# Patient Record
Sex: MALE | Race: WHITE
[De-identification: names, ages, dates, MRNs, and addresses within clinical notes are randomized per-mention and may not be internally consistent; named-entity substitution may affect disease eponyms.]

---

## 2019-01-01 ENCOUNTER — HOSPITAL ENCOUNTER (INPATIENT)
Dept: HOSPITAL 10 - FTE | Age: 34
LOS: 3 days | Discharge: HOME | DRG: 393 | End: 2019-01-04
Attending: INTERNAL MEDICINE | Admitting: INTERNAL MEDICINE
Payer: COMMERCIAL

## 2019-01-01 VITALS — SYSTOLIC BLOOD PRESSURE: 115 MMHG | HEART RATE: 98 BPM | RESPIRATION RATE: 18 BRPM | DIASTOLIC BLOOD PRESSURE: 72 MMHG

## 2019-01-01 VITALS — SYSTOLIC BLOOD PRESSURE: 123 MMHG | HEART RATE: 100 BPM | DIASTOLIC BLOOD PRESSURE: 76 MMHG | RESPIRATION RATE: 20 BRPM

## 2019-01-01 VITALS — RESPIRATION RATE: 18 BRPM | SYSTOLIC BLOOD PRESSURE: 118 MMHG | DIASTOLIC BLOOD PRESSURE: 80 MMHG

## 2019-01-01 VITALS — SYSTOLIC BLOOD PRESSURE: 118 MMHG | HEART RATE: 98 BPM | DIASTOLIC BLOOD PRESSURE: 70 MMHG | RESPIRATION RATE: 16 BRPM

## 2019-01-01 VITALS — RESPIRATION RATE: 16 BRPM | SYSTOLIC BLOOD PRESSURE: 115 MMHG | DIASTOLIC BLOOD PRESSURE: 68 MMHG | HEART RATE: 98 BPM

## 2019-01-01 VITALS — DIASTOLIC BLOOD PRESSURE: 72 MMHG | HEART RATE: 108 BPM | RESPIRATION RATE: 16 BRPM | SYSTOLIC BLOOD PRESSURE: 120 MMHG

## 2019-01-01 VITALS — HEART RATE: 94 BPM | RESPIRATION RATE: 18 BRPM | DIASTOLIC BLOOD PRESSURE: 82 MMHG | SYSTOLIC BLOOD PRESSURE: 117 MMHG

## 2019-01-01 VITALS — SYSTOLIC BLOOD PRESSURE: 115 MMHG | DIASTOLIC BLOOD PRESSURE: 74 MMHG | RESPIRATION RATE: 15 BRPM | HEART RATE: 94 BPM

## 2019-01-01 VITALS — RESPIRATION RATE: 15 BRPM | DIASTOLIC BLOOD PRESSURE: 70 MMHG | HEART RATE: 104 BPM | SYSTOLIC BLOOD PRESSURE: 123 MMHG

## 2019-01-01 VITALS — HEART RATE: 102 BPM | RESPIRATION RATE: 17 BRPM | DIASTOLIC BLOOD PRESSURE: 74 MMHG | SYSTOLIC BLOOD PRESSURE: 121 MMHG

## 2019-01-01 VITALS — DIASTOLIC BLOOD PRESSURE: 69 MMHG | RESPIRATION RATE: 14 BRPM | SYSTOLIC BLOOD PRESSURE: 117 MMHG | HEART RATE: 94 BPM

## 2019-01-01 VITALS — RESPIRATION RATE: 18 BRPM | SYSTOLIC BLOOD PRESSURE: 122 MMHG | DIASTOLIC BLOOD PRESSURE: 72 MMHG | HEART RATE: 122 BPM

## 2019-01-01 VITALS — DIASTOLIC BLOOD PRESSURE: 73 MMHG | HEART RATE: 109 BPM | RESPIRATION RATE: 19 BRPM | SYSTOLIC BLOOD PRESSURE: 124 MMHG

## 2019-01-01 VITALS — SYSTOLIC BLOOD PRESSURE: 121 MMHG | DIASTOLIC BLOOD PRESSURE: 72 MMHG | HEART RATE: 119 BPM | RESPIRATION RATE: 16 BRPM

## 2019-01-01 VITALS — HEART RATE: 118 BPM | RESPIRATION RATE: 18 BRPM | DIASTOLIC BLOOD PRESSURE: 70 MMHG | SYSTOLIC BLOOD PRESSURE: 115 MMHG

## 2019-01-01 VITALS — RESPIRATION RATE: 17 BRPM | DIASTOLIC BLOOD PRESSURE: 77 MMHG | HEART RATE: 98 BPM | SYSTOLIC BLOOD PRESSURE: 117 MMHG

## 2019-01-01 VITALS — SYSTOLIC BLOOD PRESSURE: 118 MMHG | DIASTOLIC BLOOD PRESSURE: 78 MMHG | RESPIRATION RATE: 18 BRPM | HEART RATE: 126 BPM

## 2019-01-01 VITALS — RESPIRATION RATE: 18 BRPM | HEART RATE: 95 BPM | SYSTOLIC BLOOD PRESSURE: 116 MMHG | DIASTOLIC BLOOD PRESSURE: 76 MMHG

## 2019-01-01 VITALS — SYSTOLIC BLOOD PRESSURE: 115 MMHG | HEART RATE: 110 BPM | RESPIRATION RATE: 18 BRPM | DIASTOLIC BLOOD PRESSURE: 70 MMHG

## 2019-01-01 VITALS — RESPIRATION RATE: 16 BRPM | DIASTOLIC BLOOD PRESSURE: 77 MMHG | HEART RATE: 96 BPM | SYSTOLIC BLOOD PRESSURE: 118 MMHG

## 2019-01-01 VITALS — HEART RATE: 98 BPM

## 2019-01-01 VITALS — DIASTOLIC BLOOD PRESSURE: 75 MMHG | HEART RATE: 94 BPM | RESPIRATION RATE: 15 BRPM | SYSTOLIC BLOOD PRESSURE: 117 MMHG

## 2019-01-01 VITALS — DIASTOLIC BLOOD PRESSURE: 74 MMHG | SYSTOLIC BLOOD PRESSURE: 119 MMHG | HEART RATE: 96 BPM | RESPIRATION RATE: 17 BRPM

## 2019-01-01 VITALS — DIASTOLIC BLOOD PRESSURE: 67 MMHG | RESPIRATION RATE: 16 BRPM | SYSTOLIC BLOOD PRESSURE: 116 MMHG | HEART RATE: 108 BPM

## 2019-01-01 VITALS — DIASTOLIC BLOOD PRESSURE: 80 MMHG | RESPIRATION RATE: 16 BRPM | HEART RATE: 115 BPM | SYSTOLIC BLOOD PRESSURE: 126 MMHG

## 2019-01-01 VITALS — WEIGHT: 232.37 LBS | BODY MASS INDEX: 32.53 KG/M2 | HEIGHT: 71 IN

## 2019-01-01 VITALS — HEART RATE: 117 BPM

## 2019-01-01 VITALS — DIASTOLIC BLOOD PRESSURE: 70 MMHG | RESPIRATION RATE: 15 BRPM | SYSTOLIC BLOOD PRESSURE: 117 MMHG | HEART RATE: 94 BPM

## 2019-01-01 VITALS — HEART RATE: 96 BPM | DIASTOLIC BLOOD PRESSURE: 68 MMHG | SYSTOLIC BLOOD PRESSURE: 115 MMHG

## 2019-01-01 VITALS — HEART RATE: 108 BPM

## 2019-01-01 DIAGNOSIS — A41.9: ICD-10-CM

## 2019-01-01 DIAGNOSIS — T18.128A: Primary | ICD-10-CM

## 2019-01-01 DIAGNOSIS — K21.0: ICD-10-CM

## 2019-01-01 DIAGNOSIS — J18.9: ICD-10-CM

## 2019-01-01 DIAGNOSIS — E80.6: ICD-10-CM

## 2019-01-01 PROCEDURE — 0DC58ZZ EXTIRPATION OF MATTER FROM ESOPHAGUS, VIA NATURAL OR ARTIFICIAL OPENING ENDOSCOPIC: ICD-10-PCS | Performed by: INTERNAL MEDICINE

## 2019-01-01 PROCEDURE — 71250 CT THORAX DX C-: CPT

## 2019-01-01 PROCEDURE — 87040 BLOOD CULTURE FOR BACTERIA: CPT

## 2019-01-01 PROCEDURE — 85730 THROMBOPLASTIN TIME PARTIAL: CPT

## 2019-01-01 PROCEDURE — 71045 X-RAY EXAM CHEST 1 VIEW: CPT

## 2019-01-01 PROCEDURE — 83735 ASSAY OF MAGNESIUM: CPT

## 2019-01-01 PROCEDURE — 80048 BASIC METABOLIC PNL TOTAL CA: CPT

## 2019-01-01 PROCEDURE — 80076 HEPATIC FUNCTION PANEL: CPT

## 2019-01-01 PROCEDURE — 83690 ASSAY OF LIPASE: CPT

## 2019-01-01 PROCEDURE — 83036 HEMOGLOBIN GLYCOSYLATED A1C: CPT

## 2019-01-01 PROCEDURE — 88312 SPECIAL STAINS GROUP 1: CPT

## 2019-01-01 PROCEDURE — 96375 TX/PRO/DX INJ NEW DRUG ADDON: CPT

## 2019-01-01 PROCEDURE — C9113 INJ PANTOPRAZOLE SODIUM, VIA: HCPCS

## 2019-01-01 PROCEDURE — 96374 THER/PROPH/DIAG INJ IV PUSH: CPT

## 2019-01-01 PROCEDURE — 88313 SPECIAL STAINS GROUP 2: CPT

## 2019-01-01 PROCEDURE — 84100 ASSAY OF PHOSPHORUS: CPT

## 2019-01-01 PROCEDURE — 70490 CT SOFT TISSUE NECK W/O DYE: CPT

## 2019-01-01 PROCEDURE — 85610 PROTHROMBIN TIME: CPT

## 2019-01-01 PROCEDURE — 36415 COLL VENOUS BLD VENIPUNCTURE: CPT

## 2019-01-01 PROCEDURE — 88305 TISSUE EXAM BY PATHOLOGIST: CPT

## 2019-01-01 PROCEDURE — 99217: CPT

## 2019-01-01 PROCEDURE — 80053 COMPREHEN METABOLIC PANEL: CPT

## 2019-01-01 PROCEDURE — 83605 ASSAY OF LACTIC ACID: CPT

## 2019-01-01 PROCEDURE — 85025 COMPLETE CBC W/AUTO DIFF WBC: CPT

## 2019-01-01 PROCEDURE — 84484 ASSAY OF TROPONIN QUANT: CPT

## 2019-01-01 PROCEDURE — 93005 ELECTROCARDIOGRAM TRACING: CPT

## 2019-01-01 PROCEDURE — G0378 HOSPITAL OBSERVATION PER HR: HCPCS

## 2019-01-01 PROCEDURE — 74240 X-RAY XM UPR GI TRC 1CNTRST: CPT

## 2019-01-01 RX ADMIN — PIPERACILLIN SODIUM AND TAZOBACTAM SODIUM SCH MLS/HR: 3; .375 INJECTION, POWDER, LYOPHILIZED, FOR SOLUTION INTRAVENOUS at 23:58

## 2019-01-01 RX ADMIN — PIPERACILLIN SODIUM AND TAZOBACTAM SODIUM SCH MLS/HR: 3; .375 INJECTION, POWDER, LYOPHILIZED, FOR SOLUTION INTRAVENOUS at 18:03

## 2019-01-01 RX ADMIN — FOLIC ACID SCH MLS/HR: 5 INJECTION, SOLUTION INTRAMUSCULAR; INTRAVENOUS; SUBCUTANEOUS at 16:49

## 2019-01-01 NOTE — HP
Date/Time of Note


Date/Time of Note


DATE: 1/1/19 


TIME: 11:18





Assessment/Plan


VTE Prophylaxis


Pharmacological prophylaxis:  other





Lines/Catheters


IV Catheter Type (from Nrsg):  Peripheral IV





Assessment/Plan


Hospital Course








Objective





Physical exam





General: Patient is laying in bed and answers questions appropriately


Mentation: Patient is alert and oriented 4,


Head: Normocephalic atraumatic


Eyes: EOMI, pupils reactive to light


Neck: Supple, nontender, midline


Respiratory: Clear to auscultation bilaterally


Cardiovascular: regular rate, no obvious murmurs


Gastrointestinal: non-tender to palpation, bowel sounds heard.


Neurological: Moves all extremities spontaneously


Skin: No new skin lesions





Assessment and plan





Esophageal food impaction


-Status post EGD removal


-Doing well





GERD


-Continue PPI





Disposition


-Monitor for comp occasions overnight, DC tomorrow if stable.


Result Diagram:  


1/1/19 0708                                                                     


          1/1/19 0708





Results 24hrs





Laboratory Tests


               Test
                                1/1/19
07:08


               White Blood Count                         13.3  H


               Red Blood Count                            6.02


               Hemoglobin                                18.6  H


               Hematocrit                                52.9  H


               Mean Corpuscular Volume                    87.9


               Mean Corpuscular Hemoglobin                30.9


               Mean Corpuscular Hemoglobin
Concent       35.2  



               Red Cell Distribution Width                12.5


               Platelet Count                              369


               Mean Platelet Volume                        9.6


               Immature Granulocytes %                   0.400


               Neutrophils %                              69.1


               Lymphocytes %                              20.7


               Monocytes %                                 8.1


               Eosinophils %                               1.2


               Basophils %                                 0.5


               Nucleated Red Blood Cells %                 0.0


               Immature Granulocytes #                  0.050  H


               Neutrophils #                              9.2  H


               Lymphocytes #                               2.8


               Monocytes #                                1.1  H


               Eosinophils #                               0.2


               Basophils #                                 0.1


               Nucleated Red Blood Cells #                 0.0


               Prothrombin Time                           12.0


               Prothrombin Time Ratio                      0.9


               INR International Normalized
Ratio        0.88  



               Activated Partial
Thromboplast Time       25.7  



               Sodium Level                               145  H


               Potassium Level                             4.1


               Chloride Level                              105


               Carbon Dioxide Level                         30


               Anion Gap                                    10


               Blood Urea Nitrogen                          16


               Creatinine                                 1.14


               Est Glomerular Filtrat Rate
mL/min   > 60  



               Glucose Level                               112


               Calcium Level                               9.3


               Total Bilirubin                             1.0


               Direct Bilirubin                           0.00


               Indirect Bilirubin                          1.0


               Aspartate Amino Transf
(AST/SGOT)          50  H



               Alanine Aminotransferase
(ALT/SGPT)         52  



               Alkaline Phosphatase                         77


               Troponin I                           < 0.012


               Total Protein                              8.9  H


               Albumin                                     4.8


               Globulin                                  4.10  H


               Albumin/Globulin Ratio                     1.17


               Lipase                                       47








HPI/ROS


Admit Date/Time


Admit Date/Time


Jan 1, 2019 at 07:23





Hx of Present Illness


Patient is a  male with no significant past medical history except for 


some GERD who presents to [facility] for some beef that got 


stuck in his esophagus.  Patient states that he was eating beef and then felt to


get stuck around 9 PM yesterday.  Patient currently is status post op from EGD, 


GI was able to remove meat impaction.  Currently has no acute complaints feels 


well.  Patient denies chest pain, shortness of breath, abdominal pain, headache,


double vision, leg pain.





PMH/Family/Social


Past Medical History


Medications





Current Medications


Ondansetron HCl (Zofran Inj) 4 mg BRIDGE ORDER PRN IV NAUSEA AND/OR VOMITING;  


Start 1/1/19 at 07:30;  Stop 1/2/19 at 07:29


Acetaminophen (Tylenol Tab) 650 mg ER BRIDGE PRN PO MILD PAIN(1-3)OR ELEVATED 


TEMP;  Start 1/1/19 at 07:30;  Stop 1/2/19 at 07:29


Hydromorphone HCl (Dilaudid) 0.2 mg PACU PRN IV MILD PAIN LEVEL 1-3;  Start 


1/1/19 at 09:30;  Stop 1/1/19 at 12:00


Hydromorphone HCl (Dilaudid) 0.4 mg PACU PRN IV MODERATE PAIN LEVEL 4-6;  Start 


1/1/19 at 09:30;  Stop 1/1/19 at 12:00


Hydromorphone HCl (Dilaudid) 0.6 mg PACU PRN IV SEVERE PAIN LEVEL 7-10;  Start 


1/1/19 at 09:30;  Stop 1/1/19 at 12:00


Fentanyl (Sublimaze) 25 mcg PACU ORDER PRN IV MILD PAIN LEVEL 1-3;  Start 1/1/19


at 09:30;  Stop 1/1/19 at 12:00


Fentanyl (Sublimaze) 50 mcg PACU ORDER PRN IV MODERATE PAIN LEVEL 4-6;  Start 


1/1/19 at 09:30;  Stop 1/1/19 at 12:00


Ondansetron HCl (Zofran Inj) 4 mg PACU ORDER PRN IV NAUSEA AND/OR VOMITING;  


Start 1/1/19 at 09:30;  Stop 1/1/19 at 12:00


Metoclopramide HCl (Reglan) 10 mg PACU ORDER PRN IV NAUSEA AND/OR VOMITING;  


Start 1/1/19 at 09:30;  Stop 1/1/19 at 12:00


Albuterol (Proventil 0.083% (Neb)) 2.5 mg PACU ORDER PRN HHN WHEEZING;  Start 


1/1/19 at 09:30;  Stop 1/1/19 at 12:00


Meperidine HCl (Demerol) 25 mg PACU ORDER PRN IV POST OPERATIVE SHIVERING;  


Start 1/1/19 at 09:30;  Stop 1/1/19 at 12:00


Diphenhydramine HCl (Benadryl) 25 mg PACU ORDER PRN IV PRURITUS;  Start 1/1/19 


at 09:30;  Stop 1/1/19 at 12:00


IV Flush (NS 3 ml) 3 ml PER PROTOCOL IV ;  Start 1/1/19 at 09:30


Ondansetron HCl (Zofran Inj) 4 mg Q6H  PRN IV NAUSEA AND/OR VOMITING;  Start 


1/1/19 at 09:30


Acetaminophen (Tylenol Tab) 650 mg Q6H  PRN PO PAIN LEVEL 1-3 OR FEVER;  Start 


1/1/19 at 09:30


Acetaminophen/ Hydrocodone Bitart (Norco (5/325)) 1 tab Q6H  PRN PO PAIN LEVEL 


4-6;  Start 1/1/19 at 09:30


Pantoprazole (Protonix Iv) 40 mg DAILY@06 IV ;  Start 1/2/19 at 06:00


Coded Allergies:  


     No Known Allergy (Unverified , 1/1/19)





Social History


Smoking Status:  Never smoker





Exam/Review of Systems


Vital Signs


Vitals





Vital Signs


  Date      Temp  Pulse  Resp  B/P (MAP)   Pulse Ox  O2          O2 Flow    FiO2


Time                                                 Delivery    Rate


    1/1/19           94    18      117/82        94  Room Air


     10:56                           (94)


    1/1/19                                                             2.0


     10:26


    1/1/19  98.6


     10:21














CAROLINE SCOTT                     Jan 1, 2019 11:18

## 2019-01-01 NOTE — NUR
Report given by NANCY Villagran from [facility] to continue care for patient. Patient to go to Rm#[id number]. 
CN to pick-up patient from Rm#[id number].

## 2019-01-01 NOTE — HPN
Date/Time of Note


Date/Time of Note


DATE: 1/1/19 


TIME: 10:36





Interval H&P Admission Note


Pt. seen H&P reviewed:  No system changes











YVONNE ARENAS                      Jan 1, 2019 10:36

## 2019-01-01 NOTE — NUR
DR. WALKER SEEN THE PATIENT AND ORDERED FOR LABS , CHEST XRAY AND AND EKG. CONTINUED 
MONITORING THE PATIENT, .

## 2019-01-01 NOTE — NUR
RECEIVED ORDERS FOR INJ ZOSYN  Q6 HRS BY DR. WALKER . PATIENT RESULT INFORMED TO DR. WALKER FOR 
LACTIC ACID AS 4.1 . .MD ORDERED FOR TRANSFERING THE PATIENT TO [address] [place], [place] [address]ETRY.  
TEMPERATURE AT TIS TIME . AND . INFORMED MD REGARDING THAT. ALL SAFETY PRECAUTIONS 
CONTINUED.

## 2019-01-01 NOTE — NUR
DR. WALKER CONTACTED REGARDING THE RESULTS OF CHEST XRAY AS MILD ATELECTASIS ON LEFT LOBE 
.AND SEEN EKG , BUT NOTHING SIGNIFICANT WITH EKG AS PER DR. WALKER . AWAITING RESULTS OF BLOOD 
CULTRE. .

## 2019-01-01 NOTE — NUR
CONTACTED DR SONIA VELAZQUEZ REGARDING PATIENT TEMPERATURE ELEVATED  DEGREE AND TACHYCARDIA 
AS PULSE RATE FROM FLUCTUATING BETWEEN  110-120'S. RECEIVED ORDERS FOR IV FLUID BOLUS 1 
LITER AND IV MAINTENANCE AND MONITOR PATIENT .  PATIENT CONTINUED WITH MONITORING  THE.  
VITALS . PATIENT DENIED ANY PAIN OR OTHER  COMPLAINTS AT THIS TIME.

## 2019-01-01 NOTE — NUR
RECEIVED PATIENT REPORT FROM NANCY HODGE AT 1130. RECEIVED PATIENT  AT THIS TIME. PATIENT 
 ALERT , ORIENTED , VERBAL . VITALS STABLE UPON ADMISSION.  RIGHT  AC IV SITE INRTACT AND 
PATENT WITH IV FLUID ON FLOW. PATIENT RECEIVED  S/P EGD FOR DISIMPACTED FOOD IN THROAT. ON 
CLEAR LIQUID DIET. INTRODUCED TO THE  STAFF AND ROOM . CALL LIGHT SYSTEM INSTRUCTED . ALL 
SAFETY PRECAUTIONS STARTED  TO PREVENT FALLS AND INJURIES SUCH AS BED IN THE LOWEST 
POSITION, ALARMS ON, BRAKES ON, CALL LIGHT SYSTEM WITHIN REACH.  SCD'S ON. WILL CONTINUE TO 
MONITOR.

## 2019-01-01 NOTE — NUR
PATIENT CONTINUED WITH MONITORING , NO COMPLAINTS OF PAIN . SKIN IS INTACT. NOTED ELEVATED 
TEMPERATEURE WITH 99 DEGRTEES . TYLENOL ADMINISTERED WITH  ICE PACK APPLIED. ALL SAFETY 
PRECAUTIONS CONTINUED. PATIENT TOLERATED WITH CLEAR LIQUID DIET. VOID ONCE USING URINAL

## 2019-01-01 NOTE — NUR
PATIENT REPORT GIVEN TO NANCY BAKER AT 1840  PATIENT CONTINUED TO BE MONITORED .  IV ATB 
ADMINISTERED. PATIENT COLLECTED FROM 402 ROOM AT THIS TIME. TRANSFERRED SAFELY. FAMILY 
INFORMED REGARDING TRANSFER, AND AGREED,

## 2019-01-01 NOTE — PREAC
Date/Time of Note


Date/Time of Note


DATE: 1/1/19 


TIME: 09:09





Anesthesia Eval and Record


Evaluation


Time Pre-Procedure Interview


DATE: 1/1/19 


TIME: 09:09


Age


33


Sex


male


NPO:  8 hrs


Preoperative diagnosis


Meat Impaction


Planned procedure


EGD





Past Medical History


Past Medical History:  Includes


GI:  Morbid obesity





Surgery & Anesthesia Issues


No known issue





Meds


Anticoagulation:  No


Beta Blocker within 24 hr:  No


Reason Beta Blocker not given:  Pt. not on B-Blocker





Current Medications


Ondansetron HCl (Zofran Inj) 4 mg BRIDGE ORDER PRN IV NAUSEA AND/OR VOMITING;  


Start 1/1/19 at 07:30;  Stop 1/2/19 at 07:29


Acetaminophen (Tylenol Tab) 650 mg ER BRIDGE PRN PO MILD PAIN(1-3)OR ELEVATED 


TEMP;  Start 1/1/19 at 07:30;  Stop 1/2/19 at 07:29


Meds reviewed:  Yes





Allergies


Coded Allergies:  


     No Known Allergy (Unverified , 1/1/19)


Allergies Reviewed:  Yes





Labs/Studies


Labs Reviewed:  Reviewed by anesthesiologist


Result Diagram:  


1/1/19 0708                                                                     


          1/1/19 0708





Laboratory Tests


1/1/19 07:08








Pregnancy test:  Negative


Studies:  ECG





Pre-procedure Exam


Last vitals





Vital Signs


  Date      Temp  Pulse  Resp  B/P (MAP)   Pulse Ox  O2          O2 Flow    FiO2


Time                                                 Delivery    Rate


    1/1/19           93    20     153/101       100  Room Air


     08:28                          (118)


    1/1/19  99.0


     03:14





Airway:  Adequate mouth opening, Adequate thyromental dist


Mallampati:  Mallampati II


Teeth:  Normal


Lung:  Normal


Heart:  Normal





ASA Physical Status


ASA physical status:  2


Emergency:  E





Planned Anesthetic


General/MAC:  ETT





Pre-operative Attestations


Prior to commencing anesthesia and surgery, the patient was re-evaluated, there 


was verification of:


*The patient's identity


*The results of appropriate recent lab work and preoperative vital signs


*The above evaluation not changing prior to induction


*Anesthetic plan, risk benefits, alternative and complications discussed with 


patient/family; questions answered; patient/family understands, accepts and 


wishes to proceed.











RALEIGH WINTER                Jan 1, 2019 09:10

## 2019-01-01 NOTE — NUR
CONTACTED DR. SCOTT REGARDING THE PATIENT TEMPERATURE ELEVED  DEGREES AND TACHY CARDIA 
120'S . MD  INFORMED HE WILL BE CONTACTING TO DR. WALKER FOR SEEING THE PATIENT. AWAITING FOR 
DR. SALGUERO CONTINUED WITH IV FLUIDS AND NPO AT THIS TIME.

## 2019-01-01 NOTE — ERD
ER Documentation


Chief Complaint


Chief Complaint





FOOD STUCK IN THROAT SINCE 2100





HPI


Patient is a 33-year-old male with no medical problems who presents with a meat 


impaction.  The patient says that he ate shredded pork last night around 9 PM 


and felt it get stuck.  He has not been able to swallow anything since or even 


tolerate his own saliva.  The patient has been spitting up into a bag.  He tried


drinking soda but was unable to pass the meat impaction.  He said that he has 


felt this get stuck in the past but it has passed on its own.  He has never 


needed any EGD.





ROS


All systems reviewed and are negative except as per history of present illness.





Allergies


Allergies:  


Coded Allergies:  


     No Known Allergy (Unverified , 1/1/19)





PMhx/Soc


History of Surgery:  Yes (incision and drain)


Anesthesia Reaction:  No


Hx Neurological Disorder:  No


Hx Respiratory Disorders:  No


Hx Cardiac Disorders:  No


Hx Psychiatric Problems:  No


Hx Miscellaneous Medical Probl:  Yes (GERD)


Hx Alcohol Use:  Yes (social)


Hx Substance Use:  No


Hx Tobacco Use:  No


Smoking Status:  Never smoker





FmHx


Family History:  No diabetes





Physical Exam


Vitals





Vital Signs


  Date      Temp  Pulse  Resp  B/P (MAP)   Pulse Ox  O2          O2 Flow    FiO2


Time                                                 Delivery    Rate


    1/1/19  99.0     99    18     160/103       100


     03:14                          (122)





Physical Exam


Const:   Moderate distress


Head:   Atraumatic 


Eyes:    Normal Conjunctiva


ENT:    Normal External Ears, Nose and Mouth.


Neck:               Full range of motion. No meningismus.


Resp:   Clear to auscultation bilaterally


Cardio:   Regular rate and rhythm, no murmurs


Abd:    Soft, non tender, non distended. Normal bowel sounds


Skin:   No petechiae or rashes


Back:   No midline or flank tenderness


Ext:    No cyanosis, or edema


Neur:   Awake and alert


Psych:    Normal Mood and Affect


Results 24 hrs





Laboratory Tests


               Test
                                1/1/19
07:08


               White Blood Count                    Pending


               Red Blood Count                      Pending


               Hemoglobin                           Pending


               Hematocrit                           Pending


               Mean Corpuscular Volume              Pending


               Mean Corpuscular Hemoglobin          Pending


               Mean Corpuscular Hemoglobin
Concent  Pending 



               Red Cell Distribution Width          Pending


               Platelet Count                       Pending


               Mean Platelet Volume                 Pending





Current Medications


 Medications
   Dose
          Sig/Mart
       Start Time
   Status  Last


 (Trade)       Ordered        Route
 PRN     Stop Time              Admin
Dose


                              Reason                                Admin


 Ondansetron    4 mg           ONCE  STAT
    1/1/19        DC            1/1/19


HCl
  (Zofran                 ODT
           03:53
 1/1/19                04:03



Odt)                                         03:54


 Sodium         1,000 ml @ 
   Q1H STAT
      1/1/19        DC            1/1/19


Chloride       1,000 mls/hr   IV
            06:18
 1/1/19                07:22



                                             07:17


 Ondansetron    4 mg           ONCE  STAT
    1/1/19        DC            1/1/19


HCl
  (Zofran                 IV
            06:18
 1/1/19                07:23



Inj)                                         06:20


 Glucagon
      1 mg           ONCE  STAT
    1/1/19        DC            1/1/19


(Glucagen)                    IV
            06:28
 1/1/19                07:23



                                             06:29








Procedures/MDM


EKG read by me: 


Rate/Rhythm: Regular rate and rhythm at a rate of 91


Intervals:    Normal


Impression:     No evidence of ischemia or arrhythmia





Patient is a 33-year-old male who presents with a meat impaction.  Glucagon and 


Zofran were given without success.  Laboratory studies are being checked.  I 


spoke with Dr. Vargas from gastroenterology who is on-call for GI and will see the 


patient for endoscopy.  He says he is unsure as to what time he will be able to 


do the EGD and that admitting to the hospitalist team would be the best plan.  I


spoke with Dr. Perez for admission to a medical surgical observation bed.





Departure


Diagnosis:  


   Primary Impression:  


   Retained foreign body


Condition:  NASRIN Coates MD                 Jan 1, 2019 07:27

## 2019-01-01 NOTE — NUR
Patient transferred from [facility], pick-up by charge nurse re:Dx sepsis-Lactic acid-4, per 
report NS bolus given and started with antibiotics, [id number]. A/A/Ox4, continent of G/G. 
V/S taken. Will endorse to the night nurse to continue care, assessment for patient. Will 
continue to monitor.

## 2019-01-01 NOTE — CONS
Date/Time of Note


Date/Time of Note


DATE: 1/1/19 


TIME: 20:29





Assessment/Plan


Assessment/Plan


Hospital Course


ID NOTE => SEE DR. DREYER'S FULL NOTE DICTATION OF SAME DATE


CURRENT ABX=> Zosyn #1





24H INTERVAL SUMMARY 


* SPIKING FEVERS  post urgent EGD for removal of fluid/food bezoar impaction. 


* He is feeling better -- TNS to tele for close monitoring overnight. 


* NAD -- no significant pain - no dyspnea to suggest esophageal perf -- rather 


  feels "scratch throat".


* Family present: Mother, Aunt/Uncle -- I met with them all at bedside.      


  


EXAM 


GENERAL:A/A/O -- fevers coming down, VSS, NAD


HEENT: AT,NC, anicteric moist mucous membranes, no thrush 


NECK:  Supple, trachea midline


CHEST: Equal chest rise bilaterally, without distress = lungs clear 


HEART:  Pulse RRR 


ABDOMEN: Obese, soft, NT 


EXTREMITIES:  Warm, moves all ext, no C/C/E


Neuro: Grossly intact, no deficits


Psych: Calm, polite, pleasant, good historian, appropriate eye contact, 


mood/affect normal  





ID ASSESSMENT


32 yo obese M being seen for ABX management with: 


1.  SIRS on admission with TMax 99.+, WBC 13.3, HR 99 on admission due to acute 


esophageal food impaction 


* POD#0=> s/p urgent EGD today w/removal of fluid/food bezoar impaction in the 


  distal esophagus 


2. Sepsis w/post EGD procedure fevers > 103, , leukocytosis w/WBC rising 


to 18.2 w/lactic acid 4.1


* DDx: 


   * Esophageal perf  == Highly doubt as currently in NAD


   * Esophagitis / Esophageal ulcer / local infection 


   * Suspect transient migration of bacteria across esophageal "varices" created


     by many hours of back pressure in distal esophagus from food bolus 


     impaction.


   * Aspiration Pneumonitis event pre & post procedure -- per CT significant 


     fluid build up in esophagus prior to removal 


3. GERD--- risk for esophageal scarring, stricture, ulcers, AND risk for silent 


aspiration 


INVASIVES: PIV


ABX ALLERGY: KNDA


CURRENT ABX=> Zosyn #1





ID RECOMMENDATIONS


1. Continue Zosyn 


2. f/u on BCx sent 


3. I met with patient family at bedside and discussed my suspicion: 


* Suspect local esophageal trauma injury from the food bolus impaction -- with  


  transient migration of bacteria across esophageal "varices" created by many 


  hours of back pressure in distal esophagus from the fluid build up/bezoar -- 


  as seen in the distal esophagus on the CT prior to removal.  RATIONALE: The 


  fevers spiked so high and came on so sudden post procedure == most consistent 


  with a transient GNR bacteremia. Doubt esophageal perf -- he is simply not in 


  severe distress without much pain and no dyspnea. 


* No indication for urgent CT -- he is not in distress and do not need to expose


  him to unnecessary XRT after he has already had 2 CT today. 


* Patient family express understanding, agreement, and gratitude for my visit --


  they tell me they feel assured and I explained that patient will be monitored 


  overnight by TELE monitor which also provides assurance that he will have eyes


  watching him all night. 





.


Result Diagram:  


1/1/19 1645                                                                     


          1/1/19 0708





Results 24hrs





Laboratory Tests


        Test
                                1/1/19
07:08  1/1/19
16:45


        White Blood Count                         13.3  H      18.2  #H


        Red Blood Count                            6.02          5.27


        Hemoglobin                                18.6  H        16.3


        Hematocrit                                52.9  H        46.8


        Mean Corpuscular Volume                    87.9          88.8


        Mean Corpuscular Hemoglobin                30.9          30.9


        Mean Corpuscular Hemoglobin
Concent       35.2  
       34.8  



        Red Cell Distribution Width                12.5          12.6


        Platelet Count                              369          287  #


        Mean Platelet Volume                        9.6           9.7


        Immature Granulocytes %                   0.400         0.300


        Neutrophils %                              69.1         89.5  H


        Lymphocytes %                              20.7          4.8  L


        Monocytes %                                 8.1           5.0


        Eosinophils %                               1.2           0.1


        Basophils %                                 0.5           0.3


        Nucleated Red Blood Cells %                 0.0           0.0


        Immature Granulocytes #                  0.050  H      0.060  H


        Neutrophils #                              9.2  H       16.3  H


        Lymphocytes #                               2.8           0.9


        Monocytes #                                1.1  H         0.9


        Eosinophils #                               0.2           0.0


        Basophils #                                 0.1           0.1


        Nucleated Red Blood Cells #                 0.0           0.0


        Prothrombin Time                           12.0


        Prothrombin Time Ratio                      0.9


        INR International Normalized
Ratio        0.88  
  



        Activated Partial
Thromboplast Time       25.7  
  



        Sodium Level                               145  H


        Potassium Level                             4.1


        Chloride Level                              105


        Carbon Dioxide Level                         30


        Anion Gap                                    10


        Blood Urea Nitrogen                          16


        Creatinine                                 1.14


        Est Glomerular Filtrat Rate
mL/min   > 60  
       



        Glucose Level                               112


        Calcium Level                               9.3


        Total Bilirubin                             1.0


        Direct Bilirubin                           0.00


        Indirect Bilirubin                          1.0


        Aspartate Amino Transf
(AST/SGOT)          50  H
  



        Alanine Aminotransferase
(ALT/SGPT)         52  
  



        Alkaline Phosphatase                         77


        Troponin I                           < 0.012


        Total Protein                              8.9  H


        Albumin                                     4.8


        Globulin                                  4.10  H


        Albumin/Globulin Ratio                     1.17


        Lipase                                       47


        Lactic Acid Level                                       4.1  *H








Consultation Date/Type/Reason


Admit Date/Time


Jan 1, 2019 at 07:23


Initial Consult Date


1/1/19





Exam/Review of Systems


Vital Signs


Vitals





Vital Signs


  Date      Temp   Pulse  Resp  B/P (MAP)   Pulse Ox  O2         O2 Flow    FiO2


Time                                                  Delivery   Rate


    1/1/19  100.3


     20:01


    1/1/19           117


     19:15


    1/1/19                  19      124/73        94  Room Air


     18:53                            (90)


    1/1/19                                                             2.0


     10:26








Medications


Medications





Current Medications


IV Flush (NS 3 ml) 3 ml PER PROTOCOL IV ;  Start 1/1/19 at 09:30


Ondansetron HCl (Zofran Inj) 4 mg Q6H  PRN IV NAUSEA AND/OR VOMITING;  Start 


1/1/19 at 09:30


Acetaminophen (Tylenol Tab) 650 mg Q6H  PRN PO PAIN LEVEL 1-3 OR FEVER Last 


administered on 1/1/19at 20:01; Admin Dose 650 MG;  Start 1/1/19 at 09:30


Acetaminophen/ Hydrocodone Bitart (Norco (5/325)) 1 tab Q6H  PRN PO PAIN LEVEL 


4-6;  Start 1/1/19 at 09:30


Pantoprazole (Protonix Iv) 40 mg DAILY@06 IV ;  Start 1/2/19 at 06:00


Sodium Chloride 1,000 ml @  100 mls/hr Q10H IV  Last administered on 1/1/19at 


16:49; Admin Dose 100 MLS/HR;  Start 1/1/19 at 16:00


Piperacillin Sod/ Tazobactam Sod 100 ml @  200 mls/hr Q6 IVPB  Last administered


on 1/1/19at 18:03; Admin Dose 200 MLS/HR;  Start 1/1/19 at 18:00











FLORIDALMA STEARNS NP               Jan 1, 2019 20:39

## 2019-01-01 NOTE — CONS
Date/Time of Note


Date/Time of Note


DATE: 1/1/19 


TIME: 10:27





Assessment/Plan


Assessment/Plan


Problems:  


(1) Retained foreign body


Status:  Acute


Assessment/Plan


Assessment / Plan:





1. Esophageal Food Impaction


   - patient ingested Beef cubes around 9pm last night


   - history of GERD, patient takes PPI regularly


      -rule out stricture, eosinophilic esophagitis


   - arrived at ED ~ 3am


      -they attempted to use reglan, glucagon - unable to relieve patient's 


chest pressure and mucous


   -called for EGD and food retrieval


      -urgent EGD booked. Anesthesia and GI team activated


Result Diagram:  


1/1/19 0708                                                                     


          1/1/19 0708





Results 24hrs





Laboratory Tests


               Test
                                1/1/19
07:08


               White Blood Count                         13.3  H


               Red Blood Count                            6.02


               Hemoglobin                                18.6  H


               Hematocrit                                52.9  H


               Mean Corpuscular Volume                    87.9


               Mean Corpuscular Hemoglobin                30.9


               Mean Corpuscular Hemoglobin
Concent       35.2  



               Red Cell Distribution Width                12.5


               Platelet Count                              369


               Mean Platelet Volume                        9.6


               Immature Granulocytes %                   0.400


               Neutrophils %                              69.1


               Lymphocytes %                              20.7


               Monocytes %                                 8.1


               Eosinophils %                               1.2


               Basophils %                                 0.5


               Nucleated Red Blood Cells %                 0.0


               Immature Granulocytes #                  0.050  H


               Neutrophils #                              9.2  H


               Lymphocytes #                               2.8


               Monocytes #                                1.1  H


               Eosinophils #                               0.2


               Basophils #                                 0.1


               Nucleated Red Blood Cells #                 0.0


               Prothrombin Time                           12.0


               Prothrombin Time Ratio                      0.9


               INR International Normalized
Ratio        0.88  



               Activated Partial
Thromboplast Time       25.7  



               Sodium Level                               145  H


               Potassium Level                             4.1


               Chloride Level                              105


               Carbon Dioxide Level                         30


               Anion Gap                                    10


               Blood Urea Nitrogen                          16


               Creatinine                                 1.14


               Est Glomerular Filtrat Rate
mL/min   > 60  



               Glucose Level                               112


               Calcium Level                               9.3


               Total Bilirubin                             1.0


               Direct Bilirubin                           0.00


               Indirect Bilirubin                          1.0


               Aspartate Amino Transf
(AST/SGOT)          50  H



               Alanine Aminotransferase
(ALT/SGPT)         52  



               Alkaline Phosphatase                         77


               Troponin I                           < 0.012


               Total Protein                              8.9  H


               Albumin                                     4.8


               Globulin                                  4.10  H


               Albumin/Globulin Ratio                     1.17


               Lipase                                       47








Consultation Date/Type/Reason


Admit Date/Time


Jan 1, 2019 at 07:23


Date of Consultation:  Jan 1, 2019


Type of Consult


GI


Reason for Consultation


Food impaction





Hx of Present Illness


Patient has chronic GERD.


Takes PPI OTC from [facility].


Was in usual state of health. 


Ate beef around 9pm last night. Then felt meat get stuck in his throat.


Discomfort severe, went to ED.





No prior EGDs.


Has a history of the same (not as bad episodes per patient). 


Never had food persist. 


Unable to swallow saliva.


Gastrointestinal:  vomiting, other (unable to keep secretions down.)





Past Medical History


Medications





Current Medications


Ondansetron HCl (Zofran Inj) 4 mg BRIDGE ORDER PRN IV NAUSEA AND/OR VOMITING;  


Start 1/1/19 at 07:30;  Stop 1/2/19 at 07:29


Acetaminophen (Tylenol Tab) 650 mg ER BRIDGE PRN PO MILD PAIN(1-3)OR ELEVATED 


TEMP;  Start 1/1/19 at 07:30;  Stop 1/2/19 at 07:29


Hydromorphone HCl (Dilaudid) 0.2 mg PACU PRN IV MILD PAIN LEVEL 1-3;  Start 


1/1/19 at 09:30;  Stop 1/1/19 at 12:00


Hydromorphone HCl (Dilaudid) 0.4 mg PACU PRN IV MODERATE PAIN LEVEL 4-6;  Start 


1/1/19 at 09:30;  Stop 1/1/19 at 12:00


Hydromorphone HCl (Dilaudid) 0.6 mg PACU PRN IV SEVERE PAIN LEVEL 7-10;  Start 


1/1/19 at 09:30;  Stop 1/1/19 at 12:00


Fentanyl (Sublimaze) 25 mcg PACU ORDER PRN IV MILD PAIN LEVEL 1-3;  Start 1/1/19


at 09:30;  Stop 1/1/19 at 12:00


Fentanyl (Sublimaze) 50 mcg PACU ORDER PRN IV MODERATE PAIN LEVEL 4-6;  Start 


1/1/19 at 09:30;  Stop 1/1/19 at 12:00


Ondansetron HCl (Zofran Inj) 4 mg PACU ORDER PRN IV NAUSEA AND/OR VOMITING;  


Start 1/1/19 at 09:30;  Stop 1/1/19 at 12:00


Metoclopramide HCl (Reglan) 10 mg PACU ORDER PRN IV NAUSEA AND/OR VOMITING;  


Start 1/1/19 at 09:30;  Stop 1/1/19 at 12:00


Albuterol (Proventil 0.083% (Neb)) 2.5 mg PACU ORDER PRN HHN WHEEZING;  Start 


1/1/19 at 09:30;  Stop 1/1/19 at 12:00


Meperidine HCl (Demerol) 25 mg PACU ORDER PRN IV POST OPERATIVE SHIVERING;  Sta


rt 1/1/19 at 09:30;  Stop 1/1/19 at 12:00


Diphenhydramine HCl (Benadryl) 25 mg PACU ORDER PRN IV PRURITUS;  Start 1/1/19 


at 09:30;  Stop 1/1/19 at 12:00


Sodium Chloride 1,000 ml @  50 mls/hr Q20H IV ;  Start 1/1/19 at 09:10


IV Flush (NS 3 ml) 3 ml PER PROTOCOL IV ;  Start 1/1/19 at 09:30


Ondansetron HCl (Zofran Inj) 4 mg Q6H  PRN IV NAUSEA AND/OR VOMITING;  Start 


1/1/19 at 09:30


Acetaminophen (Tylenol Tab) 650 mg Q6H  PRN PO PAIN LEVEL 1-3 OR FEVER;  Start 


1/1/19 at 09:30


Acetaminophen/ Hydrocodone Bitart (Norco (5/325)) 1 tab Q6H  PRN PO PAIN LEVEL 


4-6;  Start 1/1/19 at 09:30


Pantoprazole (Protonix Iv) 40 mg DAILY@06 IV ;  Start 1/2/19 at 06:00


Allergies:  


Coded Allergies:  


     No Known Allergy (Unverified , 1/1/19)





Social History


Smoking Status:  Never smoker





Exam/Review of Systems


Vital Signs


Vitals





Vital Signs


  Date      Temp  Pulse  Resp  B/P (MAP)   Pulse Ox  O2          O2 Flow    FiO2


Time                                                 Delivery    Rate


    1/1/19           93    20     153/101       100  Room Air


     08:28                          (118)


    1/1/19  99.0


     03:14








Exam


Eyes:  nl conjunctiva, EOMI, nl lids, nl sclera, PERRL


Respiratory:  clear to auscultation, normal air movement


Cardiovascular:  regular rate and rhythm, nl pulses


Gastrointestinal:  soft, nl liver, spleen, non-tender


Extremities:  normal pulses


Skin:  nl turgor; 


   No rash or lesions





Medications


Medications





Current Medications


Ondansetron HCl (Zofran Inj) 4 mg BRIDGE ORDER PRN IV NAUSEA AND/OR VOMITING;  


Start 1/1/19 at 07:30;  Stop 1/2/19 at 07:29


Acetaminophen (Tylenol Tab) 650 mg ER BRIDGE PRN PO MILD PAIN(1-3)OR ELEVATED 


TEMP;  Start 1/1/19 at 07:30;  Stop 1/2/19 at 07:29


Hydromorphone HCl (Dilaudid) 0.2 mg PACU PRN IV MILD PAIN LEVEL 1-3;  Start 


1/1/19 at 09:30;  Stop 1/1/19 at 12:00


Hydromorphone HCl (Dilaudid) 0.4 mg PACU PRN IV MODERATE PAIN LEVEL 4-6;  Start 


1/1/19 at 09:30;  Stop 1/1/19 at 12:00


Hydromorphone HCl (Dilaudid) 0.6 mg PACU PRN IV SEVERE PAIN LEVEL 7-10;  Start 


1/1/19 at 09:30;  Stop 1/1/19 at 12:00


Fentanyl (Sublimaze) 25 mcg PACU ORDER PRN IV MILD PAIN LEVEL 1-3;  Start 1/1/19


at 09:30;  Stop 1/1/19 at 12:00


Fentanyl (Sublimaze) 50 mcg PACU ORDER PRN IV MODERATE PAIN LEVEL 4-6;  Start 


1/1/19 at 09:30;  Stop 1/1/19 at 12:00


Ondansetron HCl (Zofran Inj) 4 mg PACU ORDER PRN IV NAUSEA AND/OR VOMITING;  


Start 1/1/19 at 09:30;  Stop 1/1/19 at 12:00


Metoclopramide HCl (Reglan) 10 mg PACU ORDER PRN IV NAUSEA AND/OR VOMITING;  


Start 1/1/19 at 09:30;  Stop 1/1/19 at 12:00


Albuterol (Proventil 0.083% (Neb)) 2.5 mg PACU ORDER PRN HHN WHEEZING;  Start 


1/1/19 at 09:30;  Stop 1/1/19 at 12:00


Meperidine HCl (Demerol) 25 mg PACU ORDER PRN IV POST OPERATIVE SHIVERING;  


Start 1/1/19 at 09:30;  Stop 1/1/19 at 12:00


Diphenhydramine HCl (Benadryl) 25 mg PACU ORDER PRN IV PRURITUS;  Start 1/1/19 


at 09:30;  Stop 1/1/19 at 12:00


Sodium Chloride 1,000 ml @  50 mls/hr Q20H IV ;  Start 1/1/19 at 09:10


IV Flush (NS 3 ml) 3 ml PER PROTOCOL IV ;  Start 1/1/19 at 09:30


Ondansetron HCl (Zofran Inj) 4 mg Q6H  PRN IV NAUSEA AND/OR VOMITING;  Start 


1/1/19 at 09:30


Acetaminophen (Tylenol Tab) 650 mg Q6H  PRN PO PAIN LEVEL 1-3 OR FEVER;  Start 


1/1/19 at 09:30


Acetaminophen/ Hydrocodone Bitart (Norco (5/325)) 1 tab Q6H  PRN PO PAIN LEVEL 


4-6;  Start 1/1/19 at 09:30


Pantoprazole (Protonix Iv) 40 mg DAILY@06 IV ;  Start 1/2/19 at 06:00











YVONNE ARENAS                      Jan 1, 2019 10:35

## 2019-01-02 VITALS — HEART RATE: 90 BPM | RESPIRATION RATE: 19 BRPM | DIASTOLIC BLOOD PRESSURE: 79 MMHG | SYSTOLIC BLOOD PRESSURE: 140 MMHG

## 2019-01-02 VITALS — RESPIRATION RATE: 18 BRPM | HEART RATE: 72 BPM | SYSTOLIC BLOOD PRESSURE: 127 MMHG | DIASTOLIC BLOOD PRESSURE: 75 MMHG

## 2019-01-02 VITALS — HEART RATE: 87 BPM

## 2019-01-02 VITALS — HEART RATE: 99 BPM

## 2019-01-02 VITALS — RESPIRATION RATE: 20 BRPM | HEART RATE: 94 BPM | DIASTOLIC BLOOD PRESSURE: 69 MMHG | SYSTOLIC BLOOD PRESSURE: 126 MMHG

## 2019-01-02 VITALS — RESPIRATION RATE: 19 BRPM | SYSTOLIC BLOOD PRESSURE: 133 MMHG | HEART RATE: 99 BPM | DIASTOLIC BLOOD PRESSURE: 72 MMHG

## 2019-01-02 VITALS — HEART RATE: 98 BPM

## 2019-01-02 VITALS — HEART RATE: 83 BPM

## 2019-01-02 VITALS — DIASTOLIC BLOOD PRESSURE: 79 MMHG | HEART RATE: 92 BPM | RESPIRATION RATE: 20 BRPM | SYSTOLIC BLOOD PRESSURE: 128 MMHG

## 2019-01-02 VITALS — RESPIRATION RATE: 18 BRPM | SYSTOLIC BLOOD PRESSURE: 125 MMHG | DIASTOLIC BLOOD PRESSURE: 78 MMHG | HEART RATE: 82 BPM

## 2019-01-02 VITALS — HEART RATE: 93 BPM

## 2019-01-02 VITALS — HEART RATE: 88 BPM | RESPIRATION RATE: 18 BRPM | SYSTOLIC BLOOD PRESSURE: 130 MMHG | DIASTOLIC BLOOD PRESSURE: 73 MMHG

## 2019-01-02 VITALS — HEART RATE: 90 BPM

## 2019-01-02 RX ADMIN — FOLIC ACID SCH MLS/HR: 5 INJECTION, SOLUTION INTRAMUSCULAR; INTRAVENOUS; SUBCUTANEOUS at 11:23

## 2019-01-02 RX ADMIN — PIPERACILLIN SODIUM AND TAZOBACTAM SODIUM SCH MLS/HR: 3; .375 INJECTION, POWDER, LYOPHILIZED, FOR SOLUTION INTRAVENOUS at 11:23

## 2019-01-02 RX ADMIN — FOLIC ACID SCH MLS/HR: 5 INJECTION, SOLUTION INTRAMUSCULAR; INTRAVENOUS; SUBCUTANEOUS at 22:51

## 2019-01-02 RX ADMIN — FOLIC ACID SCH MLS/HR: 5 INJECTION, SOLUTION INTRAMUSCULAR; INTRAVENOUS; SUBCUTANEOUS at 02:00

## 2019-01-02 RX ADMIN — PIPERACILLIN SODIUM AND TAZOBACTAM SODIUM SCH MLS/HR: 3; .375 INJECTION, POWDER, LYOPHILIZED, FOR SOLUTION INTRAVENOUS at 05:57

## 2019-01-02 RX ADMIN — PANTOPRAZOLE SODIUM SCH MG: 40 INJECTION, POWDER, FOR SOLUTION INTRAVENOUS at 05:57

## 2019-01-02 RX ADMIN — PIPERACILLIN SODIUM AND TAZOBACTAM SODIUM SCH MLS/HR: 3; .375 INJECTION, POWDER, LYOPHILIZED, FOR SOLUTION INTRAVENOUS at 18:27

## 2019-01-02 RX ADMIN — FOLIC ACID SCH MLS/HR: 5 INJECTION, SOLUTION INTRAMUSCULAR; INTRAVENOUS; SUBCUTANEOUS at 04:59

## 2019-01-02 NOTE — NUR
Pt arrived on unit at around 2340. Pt was transported by wheelchair. Pt in no signs of acute 
distress. Vitals signs taken and were WNL. Pt is afebrile and denies pain at this time. Pt 
inventory taken and pt states to have all his belongings with him. Pictures of heels and 
sacrum taken per protocol. Fall precautions implemented. Pt explained to call for help when 
getting out of bed. Will continue to monitor.

## 2019-01-02 NOTE — PN
Date/Time of Note


Date/Time of Note


DATE: 1/2/19 


TIME: 13:20





Objective


Vitals





Vital Signs


  Date      Temp  Pulse  Resp  B/P (MAP)   Pulse Ox  O2          O2 Flow    FiO2


Time                                                 Delivery    Rate


    1/2/19           93


     12:31


    1/2/19  98.3           20      128/79        16  Room Air


     11:14                           (95)


    1/1/19                                                             2.0


     10:26








Intake and Output





1/1/19 1/1/19 1/2/19





1515:00


23:00


07:00





IntakeIntake Total


500 ml


400 ml


900 ml





OutputOutput Total


200 ml





BalanceBalance


300 ml


400 ml


900 ml














Results


Result Diagram:  


1/2/19 0527                                                                     


          1/2/19 0527








Medications


Medications





Current Medications


IV Flush (NS 3 ml) 3 ml PER PROTOCOL IV ;  Start 1/1/19 at 09:30


Ondansetron HCl (Zofran Inj) 4 mg Q6H  PRN IV NAUSEA AND/OR VOMITING;  Start 


1/1/19 at 09:30


Acetaminophen (Tylenol Tab) 650 mg Q6H  PRN PO PAIN LEVEL 1-3 OR FEVER Last 


administered on 1/1/19at 23:58; Admin Dose 650 MG;  Start 1/1/19 at 09:30


Acetaminophen/ Hydrocodone Bitart (Norco (5/325)) 1 tab Q6H  PRN PO PAIN LEVEL 


4-6 Last administered on 1/2/19at 11:22; Admin Dose 1 TAB;  Start 1/1/19 at 


09:30


Pantoprazole (Protonix Iv) 40 mg DAILY@06 IV  Last administered on 1/2/19at 


05:57; Admin Dose 40 MG;  Start 1/2/19 at 06:00


Sodium Chloride 1,000 ml @  100 mls/hr Q10H IV  Last administered on 1/2/19at 


04:59; Admin Dose 100 MLS/HR;  Start 1/1/19 at 16:00


Piperacillin Sod/ Tazobactam Sod 100 ml @  200 mls/hr Q6 IVPB  Last administered


on 1/2/19at 11:23; Admin Dose 200 MLS/HR;  Start 1/1/19 at 18:00





VTE Prophylaxis


Risk score (from Ns)>0 risk:  1


SCD applied (from Nsg):  No


SCD contraindication:  other





Lines/Catheters


IV Catheter Type:  


Link in Place:  No





Assessment/Plan


Hospital Course


subjective


patient feeling better





Objective





Physical exam





General: Patient is laying in bed and answers questions appropriately


Mentation: Patient is alert and oriented 4,


Head: Normocephalic atraumatic


Eyes: EOMI, pupils reactive to light


Neck: Supple, nontender, midline


Respiratory: Clear to auscultation bilaterally


Cardiovascular: regular rate, no obvious murmurs


Gastrointestinal: non-tender to palpation, bowel sounds heard.


Neurological: Moves all extremities spontaneously


Skin: No new skin lesions





Assessment and plan





sepsis


-? source, ?throat


-NS


-IVF


-IV ABX


-monitor





tachycardia


-2/2 to above


-monitor





Esophageal food impaction


-Status post EGD removal


-GI series pending





GERD


-Continue PPI





Disposition


-ID and GI recs


-upper GI series pending











CAROLINE SCOTT                     Jan 2, 2019 13:28

## 2019-01-02 NOTE — NUR
EOSS:

 Patient still NPO, went down to Radiology for abdominal test ordered. IV fluids on going. 
Patient with low grade on and off. Antibiotics given as ordered. This afternoon patient 
stated having loose stool, per patient drank contrast before the test. Will endorsed to 
night nurse to monitor bowel movements, [id number]. Will continue to monitor.

## 2019-01-02 NOTE — CONS
DATE OF ADMISSION: 01/01/2019

DATE OF CONSULTATION:  01/01/2019

 

 

 

REASON FOR CONSULTATION:  Antibiotic management.

 

HISTORY OF PRESENT ILLNESS:  Dano Alonso is a 33-year-old male who had food stuck in his throat sin
ce 2100 of 12/31/2018.  He presents with meat impaction.  The patient says that he ate shredded pork 
around 9 p.m. and felt it get stuck.  He has not been able to swallow anything since or even tolerate
d his own saliva.  He has been unable to pass the meat impaction.  He said that he has felt things ge
tting stuck in the past, but it has passed on its own.  The patient has a history of GERD.  His white
 count on admission was 13.3 and then 18.2 with hemoglobin of 16.3 and 46.8, platelet count 287,000 w
ith 90% polys, or polymorphonucleocytes.  He had a lactic acid of 4.1.  His BUN and creatinine were 1
6/1.4.  A chest x-ray showed mild atelectasis at the lung base, otherwise unremarkable chest radiogra
ph.  A soft tissue neck CT showed fluid-filled distention of the visualized upper esophagus.  No defi
nite radiopaque foreign body or bezoar.  Correlation with upper GI and/or chest CT was recommended fo
r further evaluation.  A chest CT showed fluid-filled distention of the esophagus secondary to soft t
issue/bezoar in the distal third of the esophagus.  Findings are in keeping with the patient's histor
y of a food impaction.  Correlate with direct visual inspection.

 

HOSPITAL COURSE:  The patient was seen by Dr. Pura Vargas, who called for EGD and food retrieval.  Urgen
t EGD was booked.  Anesthesia and GI team activated.  The patient had an EGD and the esophageal food 
impaction or the food itself was removed.  The patient was placed on Zosyn 3.375 grams IV piggyback q
.6 because of elevated white count of 18.2.

 

OPERATIONS:  None, he only had history of incision and drainage.

 

PAST MEDICAL HISTORY:  As noted.

 

FAMILY HISTORY:  Noncontributory.

 

SOCIAL HISTORY:  He drinks alcohol socially.  He does not smoke or abuse drugs.

 

ALLERGIES:  NONE TO PENICILLIN, SULFA OR FOODS.

 

MEDICATIONS:  Per chart.

 

REVIEW OF SYSTEMS:  Noncontributory.

 

PHYSICAL EXAMINATION:

GENERAL:  The patient is alert, responsive, in no acute distress.

VITAL SIGNS:  Stable.  He is afebrile.

SKIN:  Without generalized rash.

HEENT:  Within normal limits.

NECK:  Supple.

LYMPH NODES:  None palpable.

CHEST:  Decreased breath sounds at the bases.

HEART:  Without murmur or gallop.

ABDOMEN:  Soft, nontender, without organosplenomegaly or masses.

EXTREMITIES:  Without cyanosis, clubbing, or edema.

RECTAL AND GENITAL:  Deferred.

NEUROLOGICAL:  Within normal limits.

 

IMPRESSION AND PLAN:  Dano Alonso is a 33-year-old male who had meat impaction.  It is unclear why 
his white count is 18.2.  There is no evidence of perforation at this point.  However, I concur with 
the use of Zosyn in view of the fact that he has such a high leukocytosis.  He is currently febrile u
p to 103 and I am concerned that there may be a perforation.  A repeat CT scan may be necessary.  I w
ill dictate my findings to the hospitalist.

 

 

Dictated By: JERROLD DREYER MD JD/ALENA

DD:    01/01/2019 18:34:02

DT:    01/02/2019 00:15:58

Conf#: [id number]

DID#:  [id number]

CC: CAROLINE SCOTT MD;*EndCC*

## 2019-01-02 NOTE — NUR
END OF SHIFT REPORT



PATIENT TRANSFERRED FROM [facility] FOR TACHYCARDIA AND EVALUATION OF THE CAUSE OF FEVER.



HE WAS FEBRILE THROUGHOUT THE NIGHT AND LYZED ONLY THIS MORNING. GIVEN 2 DOSES OF 
ACETAMINOPHEN 650 MG.



NO COMPLAINT EXCEPT FOR THE HIGH GRADE FEVER. MAINTAINED ON NPO (EXCEPT FOR MEDS), ON NSS 
RUNNIKNG /HR.

## 2019-01-02 NOTE — CONS
Date/Time of Note


Date/Time of Note


DATE: 1/2/19 


TIME: 14:07





Assessment/Plan


Assessment/Plan


Hospital Course


Patient is alert and feels much better looks comfortable no fevers overnight WBC


22.4 platelets 271 neutrophils 82.9 BUN 15 creatinine 1.20





Antimicrobials: Zosyn





Physical examination: Well-developed obese middle-aged man who is alert in no 


distress.  Head atraumatic normocephalic sclera nonicteric.  Neck is supple.  


Chest rise symmetrical.  Breath sounds clear.  Heart: S1-S2.  Abdomen soft bowel


sounds present.  Extremities without cyanosis





Assessment:


1.  Systemic inflammatory response syndrome with ongoing leukocytosis


2.  Food impaction status post EGD 1/1/19, successfully cleared endoscopically


3.  GERD





Plan: Patient is stable, gastroenterology on case, continue present care, 


continue antibiotics for now, follow labs in a.m. pending upper GI series


Result Diagram:  


1/2/19 0527                                                                     


          1/2/19 0527





Results 24hrs





Laboratory Tests


 Test
                                1/1/19
16:45  1/1/19
20:41  1/2/19
05:27


 White Blood Count                        18.2  #H                    22.4  #H


 Red Blood Count                            5.27                        5.06


 Hemoglobin                                 16.3                        15.8


 Hematocrit                                 46.8                        45.9


 Mean Corpuscular Volume                    88.8                        90.7


 Mean Corpuscular Hemoglobin                30.9                        31.2


 Mean Corpuscular Hemoglobin
Concent       34.8  
  
                  34.4  



 Red Cell Distribution Width                12.6                        13.0


 Platelet Count                             287  #                       271


 Mean Platelet Volume                        9.7                         9.9


 Immature Granulocytes %                   0.300                      0.600  H


 Neutrophils %                             89.5  H                     82.9  H


 Lymphocytes %                              4.8  L                     10.6  L


 Monocytes %                                 5.0                         5.2


 Eosinophils %                               0.1                         0.3


 Basophils %                                 0.3                         0.4


 Nucleated Red Blood Cells %                 0.0                         0.0


 Immature Granulocytes #                  0.060  H                    0.140  H


 Neutrophils #                             16.3  H                     18.6  H


 Lymphocytes #                               0.9                         2.4


 Monocytes #                                 0.9                        1.2  H


 Eosinophils #                               0.0                         0.1


 Basophils #                                 0.1                         0.1


 Nucleated Red Blood Cells #                 0.0                         0.0


 Lactic Acid Level                         4.1  *H       2.7  *H


 Sodium Level                                                            142


 Potassium Level                                                         3.8


 Chloride Level                                                          107


 Carbon Dioxide Level                                                     28


 Anion Gap                                                                 7


 Blood Urea Nitrogen                                                      15


 Creatinine                                                             1.20


 Est Glomerular Filtrat Rate
mL/min   
             
             > 60  



 Glucose Level                                                            97


 Hemoglobin A1c                                                          4.9


 Calcium Level                                                           8.6


 Magnesium Level                                                         2.0


 Total Bilirubin                                                        2.5  H


 Direct Bilirubin                                                       0.00


 Indirect Bilirubin                                                     2.5  H


 Aspartate Amino Transf
(AST/SGOT)    
             
                   47  H



 Alanine Aminotransferase
(ALT/SGPT)  
             
                    41  



 Alkaline Phosphatase                                                     55


 Total Protein                                                          7.0  #


 Albumin                                                                3.8  #


 Globulin                                                               3.20


 Albumin/Globulin Ratio                                                 1.18








Consultation Date/Type/Reason


Admit Date/Time


Jan 1, 2019 at 07:23


Initial Consult Date


1/1/19


Type of Consult


id





Exam/Review of Systems


Vital Signs


Vitals





Vital Signs


  Date      Temp  Pulse  Resp  B/P (MAP)   Pulse Ox  O2          O2 Flow    FiO2


Time                                                 Delivery    Rate


    1/2/19           93


     12:31


    1/2/19  98.3           20      128/79        16  Room Air


     11:14                           (95)


    1/1/19                                                             2.0


     10:26








Intake and Output





1/1/19 1/1/19 1/2/19





1515:00


23:00


07:00





IntakeIntake Total


500 ml


400 ml


900 ml





OutputOutput Total


200 ml





BalanceBalance


300 ml


400 ml


900 ml














Medications


Medications





Current Medications


IV Flush (NS 3 ml) 3 ml PER PROTOCOL IV ;  Start 1/1/19 at 09:30


Ondansetron HCl (Zofran Inj) 4 mg Q6H  PRN IV NAUSEA AND/OR VOMITING;  Start 


1/1/19 at 09:30


Acetaminophen (Tylenol Tab) 650 mg Q6H  PRN PO PAIN LEVEL 1-3 OR FEVER Last 


administered on 1/1/19at 23:58; Admin Dose 650 MG;  Start 1/1/19 at 09:30


Acetaminophen/ Hydrocodone Bitart (Norco (5/325)) 1 tab Q6H  PRN PO PAIN LEVEL 


4-6 Last administered on 1/2/19at 11:22; Admin Dose 1 TAB;  Start 1/1/19 at 


09:30


Pantoprazole (Protonix Iv) 40 mg DAILY@06 IV  Last administered on 1/2/19at 


05:57; Admin Dose 40 MG;  Start 1/2/19 at 06:00


Sodium Chloride 1,000 ml @  100 mls/hr Q10H IV  Last administered on 1/2/19at 


04:59; Admin Dose 100 MLS/HR;  Start 1/1/19 at 16:00


Piperacillin Sod/ Tazobactam Sod 100 ml @  200 mls/hr Q6 IVPB  Last administered


on 1/2/19at 11:23; Admin Dose 200 MLS/HR;  Start 1/1/19 at 18:00











EDGAR CERVANTES NP             Jan 2, 2019 14:08

## 2019-01-02 NOTE — PN
Date/Time of Note


Date/Time of Note


DATE: 1/2/19 


TIME: 10:16





Assessment/Plan


VTE Prophylaxis


Risk score (from Ns)>0 risk:  1


SCD applied (from [facility]):  No


SCD contraindicated:  low risk/ambulating


Pharmacological prophylaxis:  NA/contraindicated


Pharm contraindication:  low risk/ambulating





Lines/Catheters


IV Catheter Type (from [facility]):  Peripheral IV





Assessment/Plan


Hospital Course


Assessment:


Food impaction


   EGD 1/1/19


   Food impaction-successfully cleared endoscopically


   Esophagitis


Leukocytosis/Fevers


Elevated Lactic acid





Plan: 


Pt consulted by ID- patient has already been started on Zosyn


Upper GI with Gastrografin- rule out perforation- 


NPO for now


Monitor labs


Patient discussed in depth with Dr. Arenas














Subjective:


Course reviewed with nursing staff


Patient interviewed and examined


All labs, imaging and other results reviewed





The patient states he now has "body aches" T-max 103 yesterday


Bp has been stable, He denies n/v or abd pain. Pt does c/o 'weird sensation" in 


esophagus which may be r/t food bezoar 


Additionally he c/o neck stiffness. Discussed with patient plan for additional 


imaging at this time.


Result Diagram:  


1/2/19 0527                                                                     


          1/2/19 0527





Results 24hrs





Laboratory Tests


 Test
                                1/1/19
16:45  1/1/19
20:41  1/2/19
05:27


 White Blood Count                        18.2  #H                    22.4  #H


 Red Blood Count                            5.27                        5.06


 Hemoglobin                                 16.3                        15.8


 Hematocrit                                 46.8                        45.9


 Mean Corpuscular Volume                    88.8                        90.7


 Mean Corpuscular Hemoglobin                30.9                        31.2


 Mean Corpuscular Hemoglobin
Concent       34.8  
  
                  34.4  



 Red Cell Distribution Width                12.6                        13.0


 Platelet Count                             287  #                       271


 Mean Platelet Volume                        9.7                         9.9


 Immature Granulocytes %                   0.300                      0.600  H


 Neutrophils %                             89.5  H                     82.9  H


 Lymphocytes %                              4.8  L                     10.6  L


 Monocytes %                                 5.0                         5.2


 Eosinophils %                               0.1                         0.3


 Basophils %                                 0.3                         0.4


 Nucleated Red Blood Cells %                 0.0                         0.0


 Immature Granulocytes #                  0.060  H                    0.140  H


 Neutrophils #                             16.3  H                     18.6  H


 Lymphocytes #                               0.9                         2.4


 Monocytes #                                 0.9                        1.2  H


 Eosinophils #                               0.0                         0.1


 Basophils #                                 0.1                         0.1


 Nucleated Red Blood Cells #                 0.0                         0.0


 Lactic Acid Level                         4.1  *H       2.7  *H


 Sodium Level                                                            142


 Potassium Level                                                         3.8


 Chloride Level                                                          107


 Carbon Dioxide Level                                                     28


 Anion Gap                                                                 7


 Blood Urea Nitrogen                                                      15


 Creatinine                                                             1.20


 Est Glomerular Filtrat Rate
mL/min   
             
             > 60  



 Glucose Level                                                            97


 Hemoglobin A1c                                                          4.9


 Calcium Level                                                           8.6


 Magnesium Level                                                         2.0


 Total Bilirubin                                                        2.5  H


 Direct Bilirubin                                                       0.00


 Indirect Bilirubin                                                     2.5  H


 Aspartate Amino Transf
(AST/SGOT)    
             
                   47  H



 Alanine Aminotransferase
(ALT/SGPT)  
             
                    41  



 Alkaline Phosphatase                                                     55


 Total Protein                                                          7.0  #


 Albumin                                                                3.8  #


 Globulin                                                               3.20


 Albumin/Globulin Ratio                                                 1.18





CC:   YVONNE ARENAS ;





Exam/Review of Systems


Vital Signs


Vitals





Vital Signs


  Date      Temp  Pulse  Resp  B/P (MAP)   Pulse Ox  O2          O2 Flow    FiO2


Time                                                 Delivery    Rate


    1/2/19           98


     09:03


    1/2/19  98.7           20      126/69        90  Room Air


     07:36                           (88)


    1/1/19                                                             2.0


     10:26








Intake and Output





1/1/19 1/1/19 1/2/19





1414:59


22:59


06:59





IntakeIntake Total


500 ml


400 ml


900 ml





OutputOutput Total


200 ml





BalanceBalance


300 ml


400 ml


900 ml














Exam


Constitutional:  alert, oriented


Head:  normocephalic


Eyes:  nl conjunctiva


ENMT:  nl external ears & nose


Neck:  supple


Respiratory:  crackles/rales


Cardiovascular:  regular rate and rhythm


Gastrointestinal:  soft, bowel sounds; 


   No distended, No firm, No hepatomegaly, No mass





Medications


Medications





Current Medications


IV Flush (NS 3 ml) 3 ml PER PROTOCOL IV ;  Start 1/1/19 at 09:30


Ondansetron HCl (Zofran Inj) 4 mg Q6H  PRN IV NAUSEA AND/OR VOMITING;  Start 


1/1/19 at 09:30


Acetaminophen (Tylenol Tab) 650 mg Q6H  PRN PO PAIN LEVEL 1-3 OR FEVER Last 


administered on 1/1/19at 23:58; Admin Dose 650 MG;  Start 1/1/19 at 09:30


Acetaminophen/ Hydrocodone Bitart (Norco (5/325)) 1 tab Q6H  PRN PO PAIN LEVEL 


4-6;  Start 1/1/19 at 09:30


Pantoprazole (Protonix Iv) 40 mg DAILY@06 IV  Last administered on 1/2/19at 


05:57; Admin Dose 40 MG;  Start 1/2/19 at 06:00


Sodium Chloride 1,000 ml @  100 mls/hr Q10H IV  Last administered on 1/2/19at 


04:59; Admin Dose 100 MLS/HR;  Start 1/1/19 at 16:00


Piperacillin Sod/ Tazobactam Sod 100 ml @  200 mls/hr Q6 IVPB  Last administered


on 1/2/19at 05:57; Admin Dose 200 MLS/HR;  Start 1/1/19 at 18:00











ANNA ASTORGA              Jan 2, 2019 10:26

## 2019-01-03 VITALS — DIASTOLIC BLOOD PRESSURE: 70 MMHG | HEART RATE: 76 BPM | SYSTOLIC BLOOD PRESSURE: 128 MMHG | RESPIRATION RATE: 18 BRPM

## 2019-01-03 VITALS — DIASTOLIC BLOOD PRESSURE: 75 MMHG | HEART RATE: 71 BPM | SYSTOLIC BLOOD PRESSURE: 132 MMHG | RESPIRATION RATE: 18 BRPM

## 2019-01-03 VITALS — RESPIRATION RATE: 18 BRPM | SYSTOLIC BLOOD PRESSURE: 117 MMHG | HEART RATE: 69 BPM | DIASTOLIC BLOOD PRESSURE: 65 MMHG

## 2019-01-03 VITALS — SYSTOLIC BLOOD PRESSURE: 137 MMHG | HEART RATE: 64 BPM | RESPIRATION RATE: 18 BRPM | DIASTOLIC BLOOD PRESSURE: 85 MMHG

## 2019-01-03 RX ADMIN — PIPERACILLIN SODIUM AND TAZOBACTAM SODIUM SCH MLS/HR: 3; .375 INJECTION, POWDER, LYOPHILIZED, FOR SOLUTION INTRAVENOUS at 06:06

## 2019-01-03 RX ADMIN — PIPERACILLIN SODIUM AND TAZOBACTAM SODIUM SCH MLS/HR: 3; .375 INJECTION, POWDER, LYOPHILIZED, FOR SOLUTION INTRAVENOUS at 00:55

## 2019-01-03 RX ADMIN — PANTOPRAZOLE SODIUM SCH MG: 40 INJECTION, POWDER, FOR SOLUTION INTRAVENOUS at 06:07

## 2019-01-03 RX ADMIN — FOLIC ACID SCH MLS/HR: 5 INJECTION, SOLUTION INTRAMUSCULAR; INTRAVENOUS; SUBCUTANEOUS at 13:54

## 2019-01-03 RX ADMIN — PIPERACILLIN SODIUM AND TAZOBACTAM SODIUM SCH MLS/HR: 3; .375 INJECTION, POWDER, LYOPHILIZED, FOR SOLUTION INTRAVENOUS at 12:19

## 2019-01-03 RX ADMIN — FOLIC ACID SCH MLS/HR: 5 INJECTION, SOLUTION INTRAMUSCULAR; INTRAVENOUS; SUBCUTANEOUS at 07:41

## 2019-01-03 RX ADMIN — FOLIC ACID SCH MLS/HR: 5 INJECTION, SOLUTION INTRAMUSCULAR; INTRAVENOUS; SUBCUTANEOUS at 23:46

## 2019-01-03 RX ADMIN — PIPERACILLIN SODIUM AND TAZOBACTAM SODIUM SCH MLS/HR: 3; .375 INJECTION, POWDER, LYOPHILIZED, FOR SOLUTION INTRAVENOUS at 23:43

## 2019-01-03 RX ADMIN — PIPERACILLIN SODIUM AND TAZOBACTAM SODIUM SCH MLS/HR: 3; .375 INJECTION, POWDER, LYOPHILIZED, FOR SOLUTION INTRAVENOUS at 18:08

## 2019-01-03 NOTE — NUR
RN NOTES:

TRANSFER PT TO [facility]#[id number]. REPORT GIVEN TO ANDREA/RN 2E. VS CHECK, T=98.5 (O) AT 2130. LACTIC 
ACID=1.1 AT 2200 AND STILL NPO. PT HAS GIRLFRIEND AT BEDSIDE. HE SAID HE WILL CALL HIS 
FAMILY REGARDING CHANGING HIM TO [facility].

## 2019-01-03 NOTE — PN
Date/Time of Note


Date/Time of Note


DATE: 1/3/19 


TIME: 15:05





Assessment/Plan


VTE Prophylaxis


Risk score (from Ns)>0 risk:  2


SCD applied (from Ns):  No


SCD contraindicated:  low risk/ambulating


Pharmacological prophylaxis:  NA/contraindicated


Pharm contraindication:  low risk/ambulating





Lines/Catheters


IV Catheter Type (from [facility]):  


Urinary Cath still in place:  No





Assessment/Plan


Hospital Course


Assessment:


Food impaction


   EGD 1/1/19


   Food impaction-successfully cleared endoscopically


   Esophagitis


Leukocytosis- resolved


Fevers- currently afebrile


Elevated Lactic acid- resolved


Indirect hyperbilirubinemia








Plan: 


Pt appears stable and is cleared by GI for out-pt management


Continue PPI 40 mg PP qam  x4 week


Recommend f/u with PCP or GI to recheck labs regarding indirect 


hyperbilirubinemia as out-pt


D/c planning per hospitalist 


Patient discussed in depth with Dr. Vargas








Subjective:


Course reviewed with nursing staff


Patient interviewed and examined


All labs, imaging and other results reviewed


Overall patient feels much better today tolerating soft diet without difficulty.


 Discussed importance of chewing well and eating slowly to prevent further ep


isodes of food impaction. 


Upper GI x-ray shows normal upper GI series, no evidence of esophageal pe


rforation WBCs today are within normal limits.  Of note patient with indirect 


hyperbilirubinemia no known history of gallbladder syndrome.  Recommend patient 


follow-up with PCP or GI for further evaluation to recheck labs in near future.


Result Diagram:  


1/3/19 0727                                                                     


          1/3/19 0727





Results 24hrs





Laboratory Tests


        Test
                                1/2/19
22:23  1/3/19
07:27


        Lactic Acid Level                           1.1


        White Blood Count                                       10.4  #


        Red Blood Count                                          4.87


        Hemoglobin                                               15.3


        Hematocrit                                               44.0


        Mean Corpuscular Volume                                  90.3


        Mean Corpuscular Hemoglobin                              31.4


        Mean Corpuscular Hemoglobin
Concent  
                  34.8  



        Red Cell Distribution Width                              12.2


        Platelet Count                                            256


        Mean Platelet Volume                                      9.9


        Immature Granulocytes %                                 0.300


        Neutrophils %                                            71.4


        Lymphocytes %                                            18.3


        Monocytes %                                               6.8


        Eosinophils %                                             2.7


        Basophils %                                               0.5


        Nucleated Red Blood Cells %                               0.0


        Immature Granulocytes #                                 0.030


        Neutrophils #                                             7.5


        Lymphocytes #                                             1.9


        Monocytes #                                               0.7


        Eosinophils #                                             0.3


        Basophils #                                               0.1


        Nucleated Red Blood Cells #                               0.0


        Sodium Level                                              141


        Potassium Level                                           3.8


        Chloride Level                                            109


        Carbon Dioxide Level                                       25


        Anion Gap                                                   7


        Blood Urea Nitrogen                                        15


        Creatinine                                               1.02


        Est Glomerular Filtrat Rate
mL/min   
             > 60  



        Glucose Level                                              73


        Calcium Level                                            8.1  L


        Phosphorus Level                                          3.2


        Magnesium Level                                           2.1








Exam/Review of Systems


Vital Signs


Vitals





Vital Signs


  Date      Temp  Pulse  Resp  B/P (MAP)   Pulse Ox  O2          O2 Flow    FiO2


Time                                                 Delivery    Rate


    1/3/19  98.2     71    18      132/75        94  Room Air


     08:18                           (94)


    1/1/19                                                             2.0


     10:26








Intake and Output





1/2/19


1/2/19


1/3/19





1515:00


23:00


07:00





IntakeIntake Total


100 ml


750 ml


650 ml





BalanceBalance


100 ml


750 ml


650 ml














Exam


Constitutional:  alert, oriented


Psych:  no complaints


Head:  normocephalic


Eyes:  nl conjunctiva


ENMT:  nl external ears & nose


Neck:  supple


Respiratory:  clear to auscultation


Cardiovascular:  regular rate and rhythm


Gastrointestinal:  soft, non-tender


Musculoskeletal:  nl extremities to inspection





Medications


Medications





Current Medications


IV Flush (NS 3 ml) 3 ml PER PROTOCOL IV ;  Start 1/1/19 at 09:30


Ondansetron HCl (Zofran Inj) 4 mg Q6H  PRN IV NAUSEA AND/OR VOMITING;  Start 


1/1/19 at 09:30


Acetaminophen (Tylenol Tab) 650 mg Q6H  PRN PO PAIN LEVEL 1-3 OR FEVER Last 


administered on 1/2/19at 20:38; Admin Dose 650 MG;  Start 1/1/19 at 09:30


Acetaminophen/ Hydrocodone Bitart (Norco (5/325)) 1 tab Q6H  PRN PO PAIN LEVEL 


4-6 Last administered on 1/2/19at 11:22; Admin Dose 1 TAB;  Start 1/1/19 at 


09:30


Pantoprazole (Protonix Iv) 40 mg DAILY@06 IV  Last administered on 1/3/19at 


06:07; Admin Dose 40 MG;  Start 1/2/19 at 06:00


Sodium Chloride 1,000 ml @  100 mls/hr Q10H IV  Last administered on 1/3/19at 


13:54; Admin Dose 100 MLS/HR;  Start 1/1/19 at 16:00


Piperacillin Sod/ Tazobactam Sod 100 ml @  200 mls/hr Q6 IVPB  Last administered


on 1/3/19at 12:19; Admin Dose 200 MLS/HR;  Start 1/1/19 at 18:00











ANNA ASTORGA              Caesar 3, 2019 15:13

## 2019-01-03 NOTE — NUR
patient alert and oriented x 4. no SOB noted. patient denies pain. IV patent. fall 
precaution in place, call light within reach. Vital sign stable, hourly roundings made. will 
endorse to night shift nurse.

## 2019-01-03 NOTE — CONS
Date/Time of Note


Date/Time of Note


DATE: 1/3/19 


TIME: 15:49





Assessment/Plan


Assessment/Plan


Hospital Course


No acute events overnight patient feels much better white blood cell count went 


down, no fevers nausea vomiting diarrhea, no swallowing difficulties





Antimicrobials: Zosyn





Physical examination: Well-developed obese middle-aged man who is alert in no 


distress.  Head atraumatic normocephalic sclera nonicteric.  Neck is supple.  


Chest rise symmetrical.  Breath sounds clear.  Heart: S1-S2.  Abdomen soft bowel


sounds present.  Extremities without cyanosis





Assessment:


1.  Systemic inflammatory response syndrome with reactive leukocytosis


2.  Food impaction status post EGD 1/1/19, successfully cleared endoscopically


3.  GERD





Plan: Patient is stable, cleared by gastroenterology, we will will discontinue 


antibiotics


Result Diagram:  


1/3/19 0727                                                                     


          1/3/19 0727





Results 24hrs





Laboratory Tests


        Test
                                1/2/19
22:23  1/3/19
07:27


        Lactic Acid Level                           1.1


        White Blood Count                                       10.4  #


        Red Blood Count                                          4.87


        Hemoglobin                                               15.3


        Hematocrit                                               44.0


        Mean Corpuscular Volume                                  90.3


        Mean Corpuscular Hemoglobin                              31.4


        Mean Corpuscular Hemoglobin
Concent  
                  34.8  



        Red Cell Distribution Width                              12.2


        Platelet Count                                            256


        Mean Platelet Volume                                      9.9


        Immature Granulocytes %                                 0.300


        Neutrophils %                                            71.4


        Lymphocytes %                                            18.3


        Monocytes %                                               6.8


        Eosinophils %                                             2.7


        Basophils %                                               0.5


        Nucleated Red Blood Cells %                               0.0


        Immature Granulocytes #                                 0.030


        Neutrophils #                                             7.5


        Lymphocytes #                                             1.9


        Monocytes #                                               0.7


        Eosinophils #                                             0.3


        Basophils #                                               0.1


        Nucleated Red Blood Cells #                               0.0


        Sodium Level                                              141


        Potassium Level                                           3.8


        Chloride Level                                            109


        Carbon Dioxide Level                                       25


        Anion Gap                                                   7


        Blood Urea Nitrogen                                        15


        Creatinine                                               1.02


        Est Glomerular Filtrat Rate
mL/min   
             > 60  



        Glucose Level                                              73


        Calcium Level                                            8.1  L


        Phosphorus Level                                          3.2


        Magnesium Level                                           2.1








Consultation Date/Type/Reason


Admit Date/Time


Jan 2, 2019 at 17:31


Initial Consult Date


1/1/19


Type of Consult


id





Exam/Review of Systems


Vital Signs


Vitals





Vital Signs


  Date      Temp  Pulse  Resp  B/P (MAP)   Pulse Ox  O2          O2 Flow    FiO2


Time                                                 Delivery    Rate


    1/3/19  98.2     71    18      132/75        94  Room Air


     08:18                           (94)


    1/1/19                                                             2.0


     10:26








Intake and Output





1/2/19


1/2/19


1/3/19





1515:00


23:00


07:00





IntakeIntake Total


100 ml


750 ml


650 ml





BalanceBalance


100 ml


750 ml


650 ml














Medications


Medications





Current Medications


IV Flush (NS 3 ml) 3 ml PER PROTOCOL IV ;  Start 1/1/19 at 09:30


Ondansetron HCl (Zofran Inj) 4 mg Q6H  PRN IV NAUSEA AND/OR VOMITING;  Start 


1/1/19 at 09:30


Acetaminophen (Tylenol Tab) 650 mg Q6H  PRN PO PAIN LEVEL 1-3 OR FEVER Last 


administered on 1/2/19at 20:38; Admin Dose 650 MG;  Start 1/1/19 at 09:30


Acetaminophen/ Hydrocodone Bitart (Norco (5/325)) 1 tab Q6H  PRN PO PAIN LEVEL 


4-6 Last administered on 1/2/19at 11:22; Admin Dose 1 TAB;  Start 1/1/19 at 


09:30


Pantoprazole (Protonix Iv) 40 mg DAILY@06 IV  Last administered on 1/3/19at 


06:07; Admin Dose 40 MG;  Start 1/2/19 at 06:00


Sodium Chloride 1,000 ml @  100 mls/hr Q10H IV  Last administered on 1/3/19at 


13:54; Admin Dose 100 MLS/HR;  Start 1/1/19 at 16:00


Piperacillin Sod/ Tazobactam Sod 100 ml @  200 mls/hr Q6 IVPB  Last administered


on 1/3/19at 12:19; Admin Dose 200 MLS/HR;  Start 1/1/19 at 18:00











EDGAR CERVANTES NP             Caesar 3, 2019 15:50

## 2019-01-03 NOTE — NUR
End of Shift RN Note:

Pt remained asleep comfortably through the night. Pt in no signs of acute distress, vital 
signs stable. Hourly rounding was provided for the pt, was instructed to call for help when 
getting OOB. Fall precautions were implemented. Antibiotics were administered as ordered. 
Will continue to monitor through the end of my shift.

## 2019-01-03 NOTE — PAC
Date/Time of Note


Date/Time of Note


DATE: 1/3/19 


TIME: 07:20





Post-Anesthesia Notes


Post-Anesthesia Note


Last documented vital signs





Vital Signs


  Date      Temp  Pulse  Resp  B/P (MAP)   Pulse Ox  O2          O2 Flow    FiO2


Time                                                 Delivery    Rate


    1/3/19  97.6     69    18      117/65        94  Room Air


     02:00                           (82)


    1/1/19                                                             2.0


     10:26





Activity:  WNL


Respiratory function:  WNL


Cardiovascular function:  WNL


Mental status:  Baseline


Pain reasonably controlled:  Yes


Hydration appropriate:  Yes


Nausea/Vomiting absent:  Yes











RALEIGH WINTER                Caesar 3, 2019 07:20

## 2019-01-03 NOTE — PN
Date/Time of Note


Date/Time of Note


DATE: 1/3/19 


TIME: 14:58





Objective


Vitals





Vital Signs


  Date      Temp  Pulse  Resp  B/P (MAP)   Pulse Ox  O2          O2 Flow    FiO2


Time                                                 Delivery    Rate


    1/3/19  98.2     71    18      132/75        94  Room Air


     08:18                           (94)


    1/1/19                                                             2.0


     10:26








Intake and Output





1/2/19


1/2/19


1/3/19





1515:00


23:00


07:00





IntakeIntake Total


100 ml


750 ml


650 ml





BalanceBalance


100 ml


750 ml


650 ml














Results


Result Diagram:  


1/3/19 0727                                                                     


          1/3/19 0727








Medications


Medications





Current Medications


IV Flush (NS 3 ml) 3 ml PER PROTOCOL IV ;  Start 1/1/19 at 09:30


Ondansetron HCl (Zofran Inj) 4 mg Q6H  PRN IV NAUSEA AND/OR VOMITING;  Start 


1/1/19 at 09:30


Acetaminophen (Tylenol Tab) 650 mg Q6H  PRN PO PAIN LEVEL 1-3 OR FEVER Last 


administered on 1/2/19at 20:38; Admin Dose 650 MG;  Start 1/1/19 at 09:30


Acetaminophen/ Hydrocodone Bitart (Norco (5/325)) 1 tab Q6H  PRN PO PAIN LEVEL 


4-6 Last administered on 1/2/19at 11:22; Admin Dose 1 TAB;  Start 1/1/19 at 


09:30


Pantoprazole (Protonix Iv) 40 mg DAILY@06 IV  Last administered on 1/3/19at 


06:07; Admin Dose 40 MG;  Start 1/2/19 at 06:00


Sodium Chloride 1,000 ml @  100 mls/hr Q10H IV  Last administered on 1/3/19at 


13:54; Admin Dose 100 MLS/HR;  Start 1/1/19 at 16:00


Piperacillin Sod/ Tazobactam Sod 100 ml @  200 mls/hr Q6 IVPB  Last administered


on 1/3/19at 12:19; Admin Dose 200 MLS/HR;  Start 1/1/19 at 18:00





VTE Prophylaxis


Risk score (from Nsg)>0 risk:  2


SCD applied (from Nsg):  No


SCD contraindication:  other





Lines/Catheters


IV Catheter Type:  


Link in Place:  No





Assessment/Plan


Hospital Course


subjective


patient feeling better





Objective





Physical exam





General: Patient is laying in bed and answers questions appropriately


Mentation: Patient is alert and oriented 4,


Head: Normocephalic atraumatic


Eyes: EOMI, pupils reactive to light


Neck: Supple, nontender, midline


Respiratory: Clear to auscultation bilaterally


Cardiovascular: regular rate, no obvious murmurs


Gastrointestinal: non-tender to palpation, bowel sounds heard.


Neurological: Moves all extremities spontaneously


Skin: No new skin lesions





Assessment and plan





sepsis


-? source, ?throat


-hold ivf


-IV ABX, ? stop per ID?


-monitor





tachycardia


-2/2 to above


-monitor





Esophageal food impaction


-Status post EGD removal


-GI series wnl





GERD


-Continue PPI





Disposition


-ID and GI recs


-GI series WNL


-monitor and possible dc tomorrow











CAROLINE SCOTT                     Caesar 3, 2019 14:59

## 2019-01-03 NOTE — RADRPT
Vent Rate: 118 bpm

RR Interval: 0 msec

NM Interval: 190 msec

QRS Duration: 104 msec

QT Interval: 298 msec

QTC Interval: 417 msec

P-R-T Axis: 43 - 93 - -8 degrees

 

 Sinus tachycardia

 Rightward axis

 Cannot rule out Inferior infarct , age undetermined

 Abnormal ECG

 

Electronically Signed By: Toby Lora

[id number]

## 2019-01-03 NOTE — CONS
Date/Time of Note


Date/Time of Note


DATE: 1/3/19 


TIME: 15:50





Assessment/Plan


Assessment/Plan


Hospital Course


No acute events overnight, pt spiked fever yesterday, currently afebrile, nad





Antimicrobials: Zosyn





Physical examination: Well-developed obese middle-aged man who is alert in no 


distress.  Head atraumatic normocephalic sclera nonicteric.  Neck is supple.  


Chest rise symmetrical.  Breath sounds clear.  Heart: S1-S2.  Abdomen soft bowel


sounds present.  Extremities without cyanosis





Assessment:


1.  Systemic inflammatory response syndrome with reactive leukocytosis


2.  Food impaction status post EGD 1/1/19, successfully cleared endoscopically


3.  GERD





Plan: Patient is stable, cleared by gastroenterology, we will check cxr in am 


and reculture prn, keep on abx over night


Result Diagram:  


1/3/19 0727                                                                     


          1/3/19 0727





Results 24hrs





Laboratory Tests


        Test
                                1/2/19
22:23  1/3/19
07:27


        Lactic Acid Level                           1.1


        White Blood Count                                       10.4  #


        Red Blood Count                                          4.87


        Hemoglobin                                               15.3


        Hematocrit                                               44.0


        Mean Corpuscular Volume                                  90.3


        Mean Corpuscular Hemoglobin                              31.4


        Mean Corpuscular Hemoglobin
Concent  
                  34.8  



        Red Cell Distribution Width                              12.2


        Platelet Count                                            256


        Mean Platelet Volume                                      9.9


        Immature Granulocytes %                                 0.300


        Neutrophils %                                            71.4


        Lymphocytes %                                            18.3


        Monocytes %                                               6.8


        Eosinophils %                                             2.7


        Basophils %                                               0.5


        Nucleated Red Blood Cells %                               0.0


        Immature Granulocytes #                                 0.030


        Neutrophils #                                             7.5


        Lymphocytes #                                             1.9


        Monocytes #                                               0.7


        Eosinophils #                                             0.3


        Basophils #                                               0.1


        Nucleated Red Blood Cells #                               0.0


        Sodium Level                                              141


        Potassium Level                                           3.8


        Chloride Level                                            109


        Carbon Dioxide Level                                       25


        Anion Gap                                                   7


        Blood Urea Nitrogen                                        15


        Creatinine                                               1.02


        Est Glomerular Filtrat Rate
mL/min   
             > 60  



        Glucose Level                                              73


        Calcium Level                                            8.1  L


        Phosphorus Level                                          3.2


        Magnesium Level                                           2.1








Consultation Date/Type/Reason


Admit Date/Time


Jan 2, 2019 at 17:31


Initial Consult Date


1/1/19


Type of Consult


id





Exam/Review of Systems


Vital Signs


Vitals





Vital Signs


  Date      Temp  Pulse  Resp  B/P (MAP)   Pulse Ox  O2          O2 Flow    FiO2


Time                                                 Delivery    Rate


    1/3/19  98.2     71    18      132/75        94  Room Air


     08:18                           (94)


    1/1/19                                                             2.0


     10:26








Intake and Output





1/2/19


1/2/19


1/3/19





1515:00


23:00


07:00





IntakeIntake Total


100 ml


750 ml


650 ml





BalanceBalance


100 ml


750 ml


650 ml














Medications


Medications





Current Medications


IV Flush (NS 3 ml) 3 ml PER PROTOCOL IV ;  Start 1/1/19 at 09:30


Ondansetron HCl (Zofran Inj) 4 mg Q6H  PRN IV NAUSEA AND/OR VOMITING;  Start 


1/1/19 at 09:30


Acetaminophen (Tylenol Tab) 650 mg Q6H  PRN PO PAIN LEVEL 1-3 OR FEVER Last 


administered on 1/2/19at 20:38; Admin Dose 650 MG;  Start 1/1/19 at 09:30


Acetaminophen/ Hydrocodone Bitart (Norco (5/325)) 1 tab Q6H  PRN PO PAIN LEVEL 


4-6 Last administered on 1/2/19at 11:22; Admin Dose 1 TAB;  Start 1/1/19 at 


09:30


Pantoprazole (Protonix Iv) 40 mg DAILY@06 IV  Last administered on 1/3/19at 


06:07; Admin Dose 40 MG;  Start 1/2/19 at 06:00


Sodium Chloride 1,000 ml @  100 mls/hr Q10H IV  Last administered on 1/3/19at 


13:54; Admin Dose 100 MLS/HR;  Start 1/1/19 at 16:00


Piperacillin Sod/ Tazobactam Sod 100 ml @  200 mls/hr Q6 IVPB  Last administered


on 1/3/19at 12:19; Admin Dose 200 MLS/HR;  Start 1/1/19 at 18:00











EDGAR CERVANTES NP             Caesar 3, 2019 15:51

## 2019-01-04 VITALS — SYSTOLIC BLOOD PRESSURE: 127 MMHG | HEART RATE: 57 BPM | RESPIRATION RATE: 16 BRPM | DIASTOLIC BLOOD PRESSURE: 82 MMHG

## 2019-01-04 VITALS — DIASTOLIC BLOOD PRESSURE: 76 MMHG | HEART RATE: 66 BPM | RESPIRATION RATE: 17 BRPM | SYSTOLIC BLOOD PRESSURE: 128 MMHG

## 2019-01-04 RX ADMIN — PANTOPRAZOLE SODIUM SCH MG: 40 INJECTION, POWDER, FOR SOLUTION INTRAVENOUS at 06:06

## 2019-01-04 RX ADMIN — PIPERACILLIN SODIUM AND TAZOBACTAM SODIUM SCH MLS/HR: 3; .375 INJECTION, POWDER, LYOPHILIZED, FOR SOLUTION INTRAVENOUS at 06:06

## 2019-01-04 NOTE — NUR
Patient is alert and oriented x 4. no signs of SOB. patient denies pain. prescription given 
to patient, copy placed in chart. discharge instruction given and signed. patient's parents 
came to  patient, and patient asked to walk down the unit himself. patient is 
ambulatory and independent. patient was discharged at 12:45pm. IV access removed, dressing 
for IV site applied. patient's belonging with pt.

## 2019-01-04 NOTE — PDOCDIS
Discharge Instructions


CONDITION


                [id number]
Patient Condition:  [id number]
Stable








HOME CARE INSTRUCTIONS:


                 [id number]
Special Diet:  [id number]
Soft Diet








FOLLOW UP/APPOINTMENTS


Follow-up Plan


1. Follow up with your primary care provider as soon as possible. Please monitor


yourself for fever or esophageal/swallowing issues


2. Please follow up with Dr. Marky Vargas or Dr. Daryn Woody, gastroenterology, for


follow up with meat impaction removal











CAROLINE SCOTT                     Jan 4, 2019 10:51

## 2019-01-04 NOTE — NUR
End of Shift RN Note:

Pt alert amd oriented x4. No acute chnges during my shift. Pt remains stable. No complaints 
of pain from the pt during my shift. The pt is currently in bed sleeping comfortably with no 
signs of acute distress. Plan is to DC pt today. Will endorse to next shift accordingly.

## 2019-01-04 NOTE — CONS
Date/Time of Note


Date/Time of Note


DATE: 1/4/19 


TIME: 11:10





Assessment/Plan


Assessment/Plan


Hospital Course


No fevers overnight.  WBC 8.4.  No shift no bands.  BUN 11 creatinine 1.03.





Chest x-ray this morning revealed atelectasis versus minimal infiltrate in the 


lingula.  Subsegmental emphasis in the right lower lobe.





Antimicrobials: Zosyn





Physical examination: Well-developed obese middle-aged man who is alert in no 


distress.  Head atraumatic normocephalic sclera nonicteric.  Neck is supple.  


Chest rise symmetrical.  Breath sounds clear.  Heart: S1-S2.  Abdomen soft bowel


sounds present.  Extremities without cyanosis





Assessment:


1.  Systemic inflammatory response syndrome, leukocytosis resolved


2.  Food impaction status post EGD 1/1/19, successfully cleared endoscopically


3.  Possible pneumonitis





Plan: Continue present care, anticipate discharge on oral Levaquin given new 


radiographic findings for at least 5 more days


Result Diagram:  


1/4/19 0453                                                                     


          1/4/19 0453





Results 24hrs





Laboratory Tests


               Test
                                1/4/19
04:53


               White Blood Count                           8.4


               Red Blood Count                            4.83


               Hemoglobin                                 15.1


               Hematocrit                                 42.7


               Mean Corpuscular Volume                    88.4


               Mean Corpuscular Hemoglobin                31.3


               Mean Corpuscular Hemoglobin
Concent       35.4  



               Red Cell Distribution Width                11.8


               Platelet Count                              270


               Mean Platelet Volume                        9.4


               Immature Granulocytes %                  0.500  H


               Neutrophils %                              59.5


               Lymphocytes %                              24.9


               Monocytes %                                 9.1


               Eosinophils %                               5.4


               Basophils %                                 0.6


               Nucleated Red Blood Cells %                 0.0


               Immature Granulocytes #                  0.040  H


               Neutrophils #                               5.0


               Lymphocytes #                               2.1


               Monocytes #                                 0.8


               Eosinophils #                               0.5


               Basophils #                                 0.1


               Nucleated Red Blood Cells #                 0.0


               Sodium Level                                141


               Potassium Level                             3.8


               Chloride Level                              105


               Carbon Dioxide Level                         27


               Anion Gap                                     9


               Blood Urea Nitrogen                          11


               Creatinine                                 1.03


               Est Glomerular Filtrat Rate
mL/min   > 60  



               Glucose Level                                92


               Calcium Level                              8.3  L


               Phosphorus Level                            3.9


               Magnesium Level                             2.0


               Total Bilirubin                             0.8


               Direct Bilirubin                           0.00


               Indirect Bilirubin                          0.8


               Aspartate Amino Transf
(AST/SGOT)           34  



               Alanine Aminotransferase
(ALT/SGPT)         36  



               Alkaline Phosphatase                         43


               Total Protein                               6.5


               Albumin                                     3.3








Consultation Date/Type/Reason


Admit Date/Time


Jan 2, 2019 at 17:31


Initial Consult Date


1/1/19


Type of Consult


id





Exam/Review of Systems


Vital Signs


Vitals





Vital Signs


  Date      Temp  Pulse  Resp  B/P (MAP)   Pulse Ox  O2          O2 Flow    FiO2


Time                                                 Delivery    Rate


    1/4/19  98.3     57    16      127/82        95  Room Air


     07:30                           (97)


    1/1/19                                                             2.0


     10:26








Intake and Output





1/3/19


1/3/19


1/4/19





1515:00


23:00


07:00





IntakeIntake Total


100 ml


1300 ml


1200 ml





OutputOutput Total


300 ml





BalanceBalance


100 ml


1300 ml


900 ml














Medications


Medications





Current Medications


IV Flush (NS 3 ml) 3 ml PER PROTOCOL IV ;  Start 1/1/19 at 09:30


Ondansetron HCl (Zofran Inj) 4 mg Q6H  PRN IV NAUSEA AND/OR VOMITING;  Start 


1/1/19 at 09:30


Acetaminophen (Tylenol Tab) 650 mg Q6H  PRN PO PAIN LEVEL 1-3 OR FEVER Last 


administered on 1/2/19at 20:38; Admin Dose 650 MG;  Start 1/1/19 at 09:30


Acetaminophen/ Hydrocodone Bitart (Norco (5/325)) 1 tab Q6H  PRN PO PAIN LEVEL 


4-6 Last administered on 1/2/19at 11:22; Admin Dose 1 TAB;  Start 1/1/19 at 


09:30


Pantoprazole (Protonix Iv) 40 mg DAILY@06 IV  Last administered on 1/4/19at 


06:06; Admin Dose 40 MG;  Start 1/2/19 at 06:00


Piperacillin Sod/ Tazobactam Sod 100 ml @  200 mls/hr Q6 IVPB  Last administered


on 1/4/19at 06:06; Admin Dose 200 MLS/HR;  Start 1/3/19 at 18:00











EDGAR CERVANTES NP             Jan 4, 2019 11:11

## 2019-01-04 NOTE — DS
Date/Time of Note


Date/Time of Note


DATE: 1/4/19 


TIME: 10:54





Discharge Summary


Admission/Discharge Info


Admit Date/Time


Jan 2, 2019 at 17:31


Discharge Date/Time





Patient Condition:  Stable


Hx of Present Illness


Patient is a  male with no significant past medical history except for 


some GERD who presents to [facility] for some beef that got 


stuck in his esophagus.  Patient states that he was eating beef and then felt to


get stuck around 9 PM yesterday.  Patient currently is status post op from EGD, 


GI was able to remove meat impaction.  Currently has no acute complaints feels 


well.  Patient denies chest pain, shortness of breath, abdominal pain, headache,


double vision, leg pain.


Hospital Course


Patient is a young  male with no significant past medical history except


for GERD who presented to [facility] for meat impaction.  


Patient had uneventful upper endoscopy with removal of meat impaction however 


subsequently developed fever and possible sepsis soon after.  Patient was seen 


by infectious disease and GI, and monitored in the inpatient setting for a few 


days.  Patient also received upper GI series x-rays which did not show any 


esophageal or tearing.  The issues of fever, tachycardia, and discomfort were 


all likely secondary to unexplained reaction to anesthesia and infectious 


disease also agrees with this assessment.  Therefore infectious disease does not


suggest continued antibiotics and patient has been afebrile for approximately 48


hours and will be discharged with strict instructions to follow-up with GI as 


well as to monitor for fever and other signs of discomfort.





Discharge diagnoses


Sepsis, resolved


Tachycardia, resolved


Esophageal food impaction, resolved


GERD


Fever, resolved


Home Meds


No Active Prescriptions or Reported Meds


Follow-up Plan


1. Follow up with your primary care provider as soon as possible. Please monitor


yourself for fever or esophageal/swallowing issues


2. Please follow up with Dr. Marky Vargas or Dr. Daryn Woody, gastroenterology, for


follow up with meat impaction removal


Primary Care Provider


Care Physician No Primary


Time spent on discharge:   > 30 minutes


Pending Labs





Laboratory Tests


         Test
                                             1/4/19
04:53


         White Blood Count
                      8.4 10^3/ul
(4.8-10.8)


         Red Blood Count
                      4.83 10^6/ul
(4.70-6.10)


         Hemoglobin
                              15.1 g/dl
(14.0-18.0)


         Hematocrit
                                 42.7 %
(42.0-52.0)


         Mean Corpuscular Volume
                  88.4 fl
(82.0-101.0)


         Mean Corpuscular Hemoglobin
               31.3 pg
(29.0-33.0)


         Mean Corpuscular Hemoglobin
Concent      35.4 g/dl
(32.0-37.0)


         Red Cell Distribution Width
                11.8 %
(11.5-14.5)


         Platelet Count
                          270 10^3/UL
(140-415)


         Mean Platelet Volume
                        9.4 fl
(7.4-10.4)


         Immature Granulocytes %
                 0.500 %
(0.001-0.429)


         Neutrophils %
                              59.5 %
(39.0-77.0)


         Lymphocytes %
                              24.9 %
(15.0-51.0)


         Monocytes %
                                  9.1 %
(0.0-11.0)


         Eosinophils %
                                 5.4 %
(0.0-7.0)


         Basophils %
                                   0.6 %
(0.0-2.0)


         Nucleated Red Blood Cells %
             0.0 /100WBC
(0.0-0.0)


         Immature Granulocytes #
             0.040 10^3/ul
(0.0-0.031)


         Neutrophils #
                           5.0 10^3/ul
(1.6-7.5)


         Lymphocytes #
                           2.1 10^3/ul
(0.8-2.9)


         Monocytes #
                             0.8 10^3/ul
(0.3-0.9)


         Eosinophils #
                           0.5 10^3/ul
(0.0-0.5)


         Basophils #
                             0.1 10^3/ul
(0.0-0.1)


         Nucleated Red Blood Cells #
             0.0 10^3/ul
(0.0-0.0)


         Sodium Level
                             141 mmol/L
(135-144)


         Potassium Level
                          3.8 mmol/L
(3.5-5.1)


         Chloride Level
                            105 mmol/L
()


         Carbon Dioxide Level
                        27 mmol/L
(21-31)


         Anion Gap                                            9 (5-13)


         Blood Urea Nitrogen
                           11 mg/dl
(7-20)


         Creatinine
                             1.03 mg/dl
(0.61-1.24)


         Est Glomerular Filtrat Rate
mL/min   > 60 mL/min
(>60)


         Glucose Level
                               92 mg/dl
()


         Calcium Level
                            8.3 mg/dl
(8.4-10.2)


         Phosphorus Level
                          3.9 mg/dl
(2.5-4.9)


         Magnesium Level
                           2.0 mg/dl
(1.7-2.5)


         Total Bilirubin
                           0.8 mg/dl
(0.2-1.3)


         Direct Bilirubin
                       0.00 mg/dl
(0.00-0.20)


         Indirect Bilirubin
                          0.8 mg/dl
(0-1.1)


         Aspartate Amino Transf
(AST/SGOT)              34 IU/L
(15-46)


         Alanine Aminotransferase
(ALT/SGPT)            36 IU/L
(13-69)


         Alkaline Phosphatase
                         43 IU/L
()


         Total Protein
                              6.5 g/dl
(6.1-8.1)


         Albumin
                                    3.3 g/dl
(3.3-4.9)














CAROLINE SCOTT                     Jan 4, 2019 10:54